# Patient Record
Sex: MALE | Race: WHITE | NOT HISPANIC OR LATINO | Employment: OTHER | URBAN - NONMETROPOLITAN AREA
[De-identification: names, ages, dates, MRNs, and addresses within clinical notes are randomized per-mention and may not be internally consistent; named-entity substitution may affect disease eponyms.]

---

## 2019-06-04 ENCOUNTER — TRANSCRIBE ORDERS (OUTPATIENT)
Dept: ADMINISTRATIVE | Facility: HOSPITAL | Age: 39
End: 2019-06-04

## 2019-06-04 DIAGNOSIS — R42 DIZZINESS: Primary | ICD-10-CM

## 2019-06-20 ENCOUNTER — HOSPITAL ENCOUNTER (OUTPATIENT)
Dept: CARDIOLOGY | Facility: HOSPITAL | Age: 39
Discharge: HOME | End: 2019-06-20
Admitting: NURSE PRACTITIONER

## 2019-06-20 DIAGNOSIS — R42 DIZZINESS: ICD-10-CM

## 2019-06-20 PROCEDURE — 93660 TILT TABLE EVALUATION: CPT | Performed by: INTERNAL MEDICINE

## 2019-06-20 PROCEDURE — 93660 TILT TABLE EVALUATION: CPT

## 2019-06-20 RX ORDER — NITROGLYCERIN 0.4 MG/1
0.4 TABLET SUBLINGUAL ONCE
Status: COMPLETED | OUTPATIENT
Start: 2019-06-20 | End: 2019-06-20

## 2019-06-20 RX ADMIN — NITROGLYCERIN 0.4 MG: 0.4 TABLET SUBLINGUAL at 13:49

## 2019-06-21 LAB — BH CV TILT AGENT USED: NORMAL

## 2020-07-16 ENCOUNTER — OFFICE VISIT (OUTPATIENT)
Dept: INTERNAL MEDICINE | Facility: CLINIC | Age: 40
End: 2020-07-16

## 2020-07-16 ENCOUNTER — RESULTS ENCOUNTER (OUTPATIENT)
Dept: INTERNAL MEDICINE | Facility: CLINIC | Age: 40
End: 2020-07-16

## 2020-07-16 VITALS
TEMPERATURE: 97.6 F | BODY MASS INDEX: 34.38 KG/M2 | DIASTOLIC BLOOD PRESSURE: 82 MMHG | HEART RATE: 84 BPM | SYSTOLIC BLOOD PRESSURE: 122 MMHG | OXYGEN SATURATION: 96 % | WEIGHT: 245.6 LBS | HEIGHT: 71 IN

## 2020-07-16 DIAGNOSIS — M25.561 CHRONIC PAIN OF BOTH KNEES: ICD-10-CM

## 2020-07-16 DIAGNOSIS — F31.81 BIPOLAR 2 DISORDER (HCC): ICD-10-CM

## 2020-07-16 DIAGNOSIS — Z76.89 ENCOUNTER TO ESTABLISH CARE: ICD-10-CM

## 2020-07-16 DIAGNOSIS — F41.9 ANXIETY: ICD-10-CM

## 2020-07-16 DIAGNOSIS — M25.562 CHRONIC PAIN OF BOTH KNEES: ICD-10-CM

## 2020-07-16 DIAGNOSIS — Z76.89 ENCOUNTER TO ESTABLISH CARE: Primary | ICD-10-CM

## 2020-07-16 DIAGNOSIS — Z11.59 NEED FOR HEPATITIS C SCREENING TEST: ICD-10-CM

## 2020-07-16 DIAGNOSIS — G89.29 CHRONIC PAIN OF BOTH KNEES: ICD-10-CM

## 2020-07-16 PROCEDURE — 99203 OFFICE O/P NEW LOW 30 MIN: CPT | Performed by: NURSE PRACTITIONER

## 2020-07-16 RX ORDER — ARIPIPRAZOLE 10 MG/1
TABLET ORAL
COMMUNITY
Start: 2020-05-18 | End: 2021-02-09

## 2020-07-16 RX ORDER — LAMOTRIGINE 100 MG/1
TABLET ORAL
COMMUNITY
Start: 2020-05-18 | End: 2021-02-09

## 2020-07-16 RX ORDER — MELOXICAM 7.5 MG/1
7.5 TABLET ORAL DAILY
Qty: 30 TABLET | Refills: 5 | Status: SHIPPED | OUTPATIENT
Start: 2020-07-16 | End: 2021-02-09

## 2020-07-16 RX ORDER — TRAZODONE HYDROCHLORIDE 100 MG/1
TABLET ORAL
COMMUNITY
Start: 2020-05-18 | End: 2021-02-09

## 2020-07-16 RX ORDER — BUSPIRONE HYDROCHLORIDE 30 MG/1
TABLET ORAL
COMMUNITY
Start: 2020-05-18 | End: 2021-02-09
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
## 2020-07-21 ENCOUNTER — RESULTS ENCOUNTER (OUTPATIENT)
Dept: INTERNAL MEDICINE | Facility: CLINIC | Age: 40
End: 2020-07-21

## 2020-07-21 DIAGNOSIS — M25.561 CHRONIC PAIN OF BOTH KNEES: ICD-10-CM

## 2020-07-21 DIAGNOSIS — Z76.89 ENCOUNTER TO ESTABLISH CARE: ICD-10-CM

## 2020-07-21 DIAGNOSIS — Z11.59 NEED FOR HEPATITIS C SCREENING TEST: ICD-10-CM

## 2020-07-21 DIAGNOSIS — M25.562 CHRONIC PAIN OF BOTH KNEES: ICD-10-CM

## 2020-07-21 DIAGNOSIS — G89.29 CHRONIC PAIN OF BOTH KNEES: ICD-10-CM

## 2020-11-10 ENCOUNTER — OFFICE VISIT (OUTPATIENT)
Dept: INTERNAL MEDICINE | Facility: CLINIC | Age: 40
End: 2020-11-10

## 2020-11-10 VITALS — HEIGHT: 71 IN | WEIGHT: 220 LBS | BODY MASS INDEX: 30.8 KG/M2

## 2020-11-10 DIAGNOSIS — J30.9 ALLERGIC RHINITIS, UNSPECIFIED SEASONALITY, UNSPECIFIED TRIGGER: Primary | ICD-10-CM

## 2020-11-10 DIAGNOSIS — R05.3 PERSISTENT DRY COUGH: ICD-10-CM

## 2020-11-10 DIAGNOSIS — J06.9 ACUTE UPPER RESPIRATORY INFECTION, UNSPECIFIED: ICD-10-CM

## 2020-11-10 PROCEDURE — 99442 PR PHYS/QHP TELEPHONE EVALUATION 11-20 MIN: CPT | Performed by: NURSE PRACTITIONER

## 2020-11-10 RX ORDER — CETIRIZINE HYDROCHLORIDE 10 MG/1
10 CAPSULE, LIQUID FILLED ORAL DAILY
Qty: 30 CAPSULE | Refills: 2 | Status: SHIPPED | OUTPATIENT
Start: 2020-11-10 | End: 2021-02-09

## 2020-11-10 RX ORDER — METHYLPREDNISOLONE 4 MG/1
TABLET ORAL
Qty: 1 TABLET | Refills: 0 | Status: SHIPPED | OUTPATIENT
Start: 2020-11-10 | End: 2021-02-09
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
## 2021-02-09 ENCOUNTER — OFFICE VISIT (OUTPATIENT)
Dept: INTERNAL MEDICINE | Facility: CLINIC | Age: 41
End: 2021-02-09

## 2021-02-09 ENCOUNTER — HOSPITAL ENCOUNTER (OUTPATIENT)
Dept: GENERAL RADIOLOGY | Facility: HOSPITAL | Age: 41
Discharge: HOME | End: 2021-02-09
Admitting: NURSE PRACTITIONER

## 2021-02-09 VITALS
SYSTOLIC BLOOD PRESSURE: 134 MMHG | DIASTOLIC BLOOD PRESSURE: 82 MMHG | HEART RATE: 72 BPM | WEIGHT: 249 LBS | HEIGHT: 71 IN | TEMPERATURE: 97.3 F | OXYGEN SATURATION: 99 % | BODY MASS INDEX: 34.86 KG/M2

## 2021-02-09 DIAGNOSIS — G89.29 CHRONIC PAIN OF BOTH KNEES: ICD-10-CM

## 2021-02-09 DIAGNOSIS — F41.9 ANXIETY: ICD-10-CM

## 2021-02-09 DIAGNOSIS — M25.561 CHRONIC PAIN OF BOTH KNEES: ICD-10-CM

## 2021-02-09 DIAGNOSIS — M25.562 CHRONIC PAIN OF BOTH KNEES: ICD-10-CM

## 2021-02-09 DIAGNOSIS — M54.50 ACUTE MIDLINE LOW BACK PAIN WITHOUT SCIATICA: Primary | ICD-10-CM

## 2021-02-09 DIAGNOSIS — M54.50 ACUTE MIDLINE LOW BACK PAIN WITHOUT SCIATICA: ICD-10-CM

## 2021-02-09 DIAGNOSIS — F31.81 BIPOLAR 2 DISORDER (HCC): ICD-10-CM

## 2021-02-09 PROBLEM — F31.9: Status: ACTIVE | Noted: 2021-02-09

## 2021-02-09 PROBLEM — I78.1 NON-NEOPLASTIC NEVUS: Status: ACTIVE | Noted: 2021-02-09

## 2021-02-09 PROBLEM — R05.3 CHRONIC COUGH: Status: ACTIVE | Noted: 2021-02-09

## 2021-02-09 PROCEDURE — 72100 X-RAY EXAM L-S SPINE 2/3 VWS: CPT

## 2021-02-09 PROCEDURE — 99213 OFFICE O/P EST LOW 20 MIN: CPT | Performed by: NURSE PRACTITIONER

## 2021-02-09 PROCEDURE — 73560 X-RAY EXAM OF KNEE 1 OR 2: CPT

## 2021-02-09 NOTE — PROGRESS NOTES
"Subjective   Gilberto Morrissey is a 40 y.o. male.   Chief Complaint   Patient presents with   • Follow-up     3 Month follow up   • Back Pain     felt like a reinjury left lumbar spine x 2 weeks       Gilberto presents today for follow-up on bipolar type II, anxiety, and ADHD.  He was supposed to follow-up about 3 months ago but patient just able to make the appointment.  He states that he is in with a therapist currently and is scheduled to see the psychiatrist for medication management at the end of this month.  He states he is not currently on any of his medications reporting that they stopped working about a year ago and is not continued on them.  He reports that he has \"intrusive thoughts\".  He states there are times where he thinks of hurting himself but that he does not act on them but they are just \"fleeting thoughts\".  He feels he is okay to wait until seeing his psychiatric provider at the end of this month to restart any medication.    He also complains of ongoing chronic knee pain bilaterally.  An x-ray was placed in the system for order at the last visit but this was never completed.  He describes the discomfort to his knees being \"like they are not there but they hurt\".  He compares it to a phantom pain from when he had teeth pulled in the past.  He was given a prescription for meloxicam at the previous visit which he states he took for a short time and then stopped taking.  He did not feel it was helping him but also states he did not take it for very long due to forgetfulness.    He also complains today of lower back pain.  He states in the past he was helping a woman carry something out of her car and when he bent over he \"threw his back out\".  He states this happened again recently within the last week but he was not lifting or moving at the time.  He has not been taking anything for symptoms.  He states the pain is slightly worsened when he twists side to side.       The following portions of the " patient's history were reviewed and updated as appropriate: allergies, current medications, past family history, past medical history, past social history, past surgical history and problem list.    Review of Systems   Constitutional: Negative for activity change, appetite change, fatigue, fever, unexpected weight gain and unexpected weight loss.   HENT: Negative for swollen glands, trouble swallowing and voice change.    Eyes: Negative for blurred vision and visual disturbance.   Respiratory: Negative for cough and shortness of breath.    Cardiovascular: Negative for chest pain, palpitations and leg swelling.   Gastrointestinal: Negative for abdominal pain, constipation, diarrhea, nausea, vomiting and indigestion.   Endocrine: Negative for cold intolerance, heat intolerance, polydipsia and polyphagia.   Genitourinary: Negative for dysuria and frequency.   Musculoskeletal: Positive for back pain. Negative for arthralgias, joint swelling and neck pain.        Bilateral knee pain   Skin: Negative for color change, rash and skin lesions.   Neurological: Negative for dizziness, weakness, headache, memory problem and confusion.   Hematological: Does not bruise/bleed easily.   Psychiatric/Behavioral: Negative for agitation, hallucinations and suicidal ideas. The patient is nervous/anxious.        Objective   Past Medical History:   Diagnosis Date   • ADHD (attention deficit hyperactivity disorder)    • Anxiety    • Bipolar 2 disorder (CMS/HCC)    • Depression       Past Surgical History:   Procedure Laterality Date   • DENTAL PROCEDURE  2011    extraction      No current outpatient medications on file.     Vitals:    02/09/21 1512   BP: 134/82   Pulse: 72   Temp: 97.3 °F (36.3 °C)   SpO2: 99%         02/09/21  1512   Weight: 113 kg (249 lb)     Patient's Body mass index is 34.73 kg/m². BMI is above normal parameters. Recommendations include: nutrition counseling.      Physical Exam  HENT:      Right Ear: Tympanic membrane  and external ear normal.      Left Ear: Tympanic membrane and external ear normal.      Nose: Nose normal.      Mouth/Throat:      Mouth: Mucous membranes are moist. No oral lesions.      Pharynx: Oropharynx is clear.   Eyes:      Conjunctiva/sclera: Conjunctivae normal.      Pupils: Pupils are equal, round, and reactive to light.   Neck:      Musculoskeletal: Normal range of motion.      Thyroid: No thyromegaly.   Cardiovascular:      Rate and Rhythm: Normal rate and regular rhythm.      Pulses: Normal pulses.           Radial pulses are 2+ on the right side and 2+ on the left side.      Heart sounds: Normal heart sounds.   Pulmonary:      Effort: Pulmonary effort is normal.      Breath sounds: Normal breath sounds.   Musculoskeletal:        Arms:       Right lower leg: No edema.      Left lower leg: No edema.      Comments: Negative straight leg raises bilaterally   Lymphadenopathy:      Cervical: No cervical adenopathy.   Skin:     General: Skin is warm and dry.   Neurological:      Mental Status: He is alert and oriented to person, place, and time.      Gait: Gait normal.   Psychiatric:         Mood and Affect: Mood normal.         Speech: Speech normal.         Behavior: Behavior normal.         Thought Content: Thought content normal.               Assessment/Plan   Diagnoses and all orders for this visit:    1. Acute midline low back pain without sciatica (Primary)  -     XR Spine Lumbar 2 or 3 View; Future    2. Chronic pain of both knees    3. Bipolar 2 disorder (CMS/HCC)    4. Anxiety    He is going to get his yearly lab work done today while in the office.  He is not fasting today but would like to see these especially if prescribing medication such as anti-inflammatories.  I have advised him to go get the x-rays completed.  I have requested that he try using a heating pad to the lower back and ibuprofen as needed or he can start back on his meloxicam.  I have strongly encouraged him to keep his follow-up  appointments with a psychiatrist at the end of this month.  We did spend a lot of time discussing different medications that he is taken in the past and why he felt they want to work for him anymore.  I do feel like he would benefit more from having a psychiatric provider manage his mood medications.  He is agreeable to this and states that he feels okay to wait until the end of this month to restart any medication.

## 2021-02-10 LAB
ALBUMIN SERPL-MCNC: 4.8 G/DL (ref 4–5)
ALBUMIN/GLOB SERPL: 1.8 {RATIO} (ref 1.2–2.2)
ALP SERPL-CCNC: 105 IU/L (ref 39–117)
ALT SERPL-CCNC: 35 IU/L (ref 0–44)
APPEARANCE UR: CLEAR
AST SERPL-CCNC: 25 IU/L (ref 0–40)
BACTERIA #/AREA URNS HPF: NORMAL /[HPF]
BASOPHILS # BLD AUTO: 0.1 X10E3/UL (ref 0–0.2)
BASOPHILS NFR BLD AUTO: 1 %
BILIRUB SERPL-MCNC: 0.3 MG/DL (ref 0–1.2)
BILIRUB UR QL STRIP: NEGATIVE
BUN SERPL-MCNC: 13 MG/DL (ref 6–20)
BUN/CREAT SERPL: 11 (ref 9–20)
CALCIUM SERPL-MCNC: 9.7 MG/DL (ref 8.7–10.2)
CHLORIDE SERPL-SCNC: 104 MMOL/L (ref 96–106)
CHOLEST SERPL-MCNC: 171 MG/DL (ref 100–199)
CO2 SERPL-SCNC: 24 MMOL/L (ref 20–29)
COLOR UR: YELLOW
CREAT SERPL-MCNC: 1.17 MG/DL (ref 0.76–1.27)
EOSINOPHIL # BLD AUTO: 0.1 X10E3/UL (ref 0–0.4)
EOSINOPHIL NFR BLD AUTO: 1 %
EPI CELLS #/AREA URNS HPF: NORMAL /HPF (ref 0–10)
ERYTHROCYTE [DISTWIDTH] IN BLOOD BY AUTOMATED COUNT: 13.6 % (ref 11.6–15.4)
ERYTHROCYTE [SEDIMENTATION RATE] IN BLOOD BY WESTERGREN METHOD: 11 MM/HR (ref 0–15)
GLOBULIN SER CALC-MCNC: 2.6 G/DL (ref 1.5–4.5)
GLUCOSE SERPL-MCNC: 93 MG/DL (ref 65–99)
GLUCOSE UR QL: NEGATIVE
HCT VFR BLD AUTO: 44.8 % (ref 37.5–51)
HCV AB S/CO SERPL IA: <0.1 S/CO RATIO (ref 0–0.9)
HDLC SERPL-MCNC: 25 MG/DL
HGB BLD-MCNC: 15.8 G/DL (ref 13–17.7)
HGB UR QL STRIP: NEGATIVE
IMM GRANULOCYTES # BLD AUTO: 0 X10E3/UL (ref 0–0.1)
IMM GRANULOCYTES NFR BLD AUTO: 0 %
KETONES UR QL STRIP: NEGATIVE
LDLC SERPL CALC-MCNC: 72 MG/DL (ref 0–99)
LEUKOCYTE ESTERASE UR QL STRIP: NEGATIVE
LYMPHOCYTES # BLD AUTO: 1.9 X10E3/UL (ref 0.7–3.1)
LYMPHOCYTES NFR BLD AUTO: 25 %
MCH RBC QN AUTO: 28.6 PG (ref 26.6–33)
MCHC RBC AUTO-ENTMCNC: 35.3 G/DL (ref 31.5–35.7)
MCV RBC AUTO: 81 FL (ref 79–97)
MICRO URNS: NORMAL
MICRO URNS: NORMAL
MONOCYTES # BLD AUTO: 0.7 X10E3/UL (ref 0.1–0.9)
MONOCYTES NFR BLD AUTO: 9 %
MUCOUS THREADS URNS QL MICRO: PRESENT
NEUTROPHILS # BLD AUTO: 5 X10E3/UL (ref 1.4–7)
NEUTROPHILS NFR BLD AUTO: 64 %
NITRITE UR QL STRIP: NEGATIVE
PH UR STRIP: 7.5 [PH] (ref 5–7.5)
PLATELET # BLD AUTO: 297 X10E3/UL (ref 150–450)
POTASSIUM SERPL-SCNC: 4.5 MMOL/L (ref 3.5–5.2)
PROT SERPL-MCNC: 7.4 G/DL (ref 6–8.5)
PROT UR QL STRIP: NEGATIVE
RBC # BLD AUTO: 5.53 X10E6/UL (ref 4.14–5.8)
RBC #/AREA URNS HPF: NORMAL /HPF (ref 0–2)
SODIUM SERPL-SCNC: 142 MMOL/L (ref 134–144)
SP GR UR: 1.02 (ref 1–1.03)
TRIGL SERPL-MCNC: 467 MG/DL (ref 0–149)
TSH SERPL DL<=0.005 MIU/L-ACNC: 1.88 UIU/ML (ref 0.45–4.5)
URATE SERPL-MCNC: 7.8 MG/DL (ref 3.8–8.4)
UROBILINOGEN UR STRIP-MCNC: 0.2 MG/DL (ref 0.2–1)
VLDLC SERPL CALC-MCNC: 74 MG/DL (ref 5–40)
WBC # BLD AUTO: 7.8 X10E3/UL (ref 3.4–10.8)
WBC #/AREA URNS HPF: NORMAL /HPF (ref 0–5)

## 2021-02-10 NOTE — PROGRESS NOTES
Joint space narrowing to the left knee.  Normal imaging of the right knee.  We can refer to ortho is he is interested.

## 2021-02-10 NOTE — PROGRESS NOTES
Degenerative changes such as narrowing disc spaces and spurring.  At this time I would suggest physical therapy and Meloxicam which he already has.

## 2021-02-24 DIAGNOSIS — M54.50 ACUTE MIDLINE LOW BACK PAIN WITHOUT SCIATICA: Primary | ICD-10-CM

## 2021-02-24 DIAGNOSIS — M25.562 CHRONIC PAIN OF BOTH KNEES: ICD-10-CM

## 2021-02-24 DIAGNOSIS — M25.561 CHRONIC PAIN OF BOTH KNEES: ICD-10-CM

## 2021-02-24 DIAGNOSIS — G89.29 CHRONIC PAIN OF BOTH KNEES: ICD-10-CM

## 2021-02-28 ENCOUNTER — PATIENT MESSAGE (OUTPATIENT)
Dept: INTERNAL MEDICINE | Facility: CLINIC | Age: 41
End: 2021-02-28

## 2021-03-01 DIAGNOSIS — F41.9 ANXIETY: ICD-10-CM

## 2021-03-01 DIAGNOSIS — F31.9 MILD BIPOLAR DISORDER (HCC): Primary | ICD-10-CM

## 2021-03-01 NOTE — TELEPHONE ENCOUNTER
I have placed the order for this.  I am under if he should call or if they will call him.  Please let him know what he needs to do.

## 2022-03-25 ENCOUNTER — OFFICE VISIT (OUTPATIENT)
Dept: INTERNAL MEDICINE | Facility: CLINIC | Age: 42
End: 2022-03-25

## 2022-03-25 VITALS
HEIGHT: 69 IN | WEIGHT: 233.6 LBS | BODY MASS INDEX: 34.6 KG/M2 | TEMPERATURE: 97.1 F | OXYGEN SATURATION: 99 % | HEART RATE: 107 BPM | SYSTOLIC BLOOD PRESSURE: 142 MMHG | DIASTOLIC BLOOD PRESSURE: 90 MMHG

## 2022-03-25 DIAGNOSIS — F52.32 DELAYED EJACULATION: ICD-10-CM

## 2022-03-25 DIAGNOSIS — G89.29 CHRONIC PAIN OF LEFT KNEE: Primary | ICD-10-CM

## 2022-03-25 DIAGNOSIS — M25.562 CHRONIC PAIN OF LEFT KNEE: Primary | ICD-10-CM

## 2022-03-25 PROCEDURE — 99213 OFFICE O/P EST LOW 20 MIN: CPT | Performed by: NURSE PRACTITIONER

## 2022-03-25 RX ORDER — DEXTROAMPHETAMINE SACCHARATE, AMPHETAMINE ASPARTATE, DEXTROAMPHETAMINE SULFATE AND AMPHETAMINE SULFATE 2.5; 2.5; 2.5; 2.5 MG/1; MG/1; MG/1; MG/1
10 TABLET ORAL DAILY
COMMUNITY

## 2022-03-25 RX ORDER — LAMOTRIGINE 100 MG/1
200 TABLET ORAL DAILY
COMMUNITY
End: 2022-10-04 | Stop reason: SDUPTHER

## 2022-03-25 RX ORDER — DEXTROAMPHETAMINE SACCHARATE, AMPHETAMINE ASPARTATE MONOHYDRATE, DEXTROAMPHETAMINE SULFATE AND AMPHETAMINE SULFATE 2.5; 2.5; 2.5; 2.5 MG/1; MG/1; MG/1; MG/1
20 CAPSULE, EXTENDED RELEASE ORAL EVERY MORNING
COMMUNITY
End: 2022-10-04 | Stop reason: SDUPTHER

## 2022-03-25 NOTE — PROGRESS NOTES
"Smiley Morrissey is a 41 y.o. male.   Chief Complaint   Patient presents with   • Knee Pain     L knee issues, was here 1 yr ago w/treatment   • Erectile Dysfunction     Consistently has had this issue and did not know it was an issue until he was told it was       Kilo presents today for complaints of left knee pain.  He was previously evaluated for this about 1 year ago.  He started physical therapy on his back and did not see improvement so stopped going, but did not return to have completed on his knee.  She complains of constant discomfort but denies pain.  He states he either feels \"all of my weight on it and other times like it isn't there at all\".  He has been using a cane to walk when needed.  He states when he sits for a long time and then stands it can be numb.      He also would like to discuss problems with sexual performance.  He states it \"takes way too long\" to achieve orgasm.  He reports it has been this way since [facility].  No problems getting or keeping an erection.  He states it take a lot of work to achieve and erection.  He states alone he can achieve climax in 5 -10 minutes, but could take up to 45 minutes.  With a partner it can take 8 minutes at minimum but again can last up to 45 minutes but will usually have to stop and finish himself.  He states sometimes he gives up.  He states he did have depression as a teenager but was not on antidepressants at that time.          The following portions of the patient's history were reviewed and updated as appropriate: allergies, current medications, past family history, past medical history, past social history, past surgical history and problem list.    Review of Systems   Constitutional: Negative for activity change, appetite change, fatigue, fever, unexpected weight gain and unexpected weight loss.   HENT: Negative for swollen glands, trouble swallowing and voice change.    Eyes: Negative for blurred vision and visual " disturbance.   Respiratory: Negative for cough and shortness of breath.    Cardiovascular: Negative for chest pain, palpitations and leg swelling.   Gastrointestinal: Negative for abdominal pain, constipation, diarrhea, nausea, vomiting and indigestion.   Endocrine: Negative for cold intolerance, heat intolerance, polydipsia and polyphagia.   Genitourinary: Negative for dysuria and frequency.        Delayed ejaculation    Musculoskeletal: Negative for arthralgias, back pain, joint swelling and neck pain.        Left knee pain   Skin: Negative for color change, rash and skin lesions.   Neurological: Negative for dizziness, weakness, headache, memory problem and confusion.   Hematological: Does not bruise/bleed easily.   Psychiatric/Behavioral: Negative for agitation, hallucinations and suicidal ideas. The patient is not nervous/anxious.        Objective   Past Medical History:   Diagnosis Date   • ADHD (attention deficit hyperactivity disorder)    • Anxiety    • Bipolar 2 disorder (HCC)    • Depression       Past Surgical History:   Procedure Laterality Date   • DENTAL PROCEDURE  2011    extraction        Current Outpatient Medications:   •  amphetamine-dextroamphetamine (ADDERALL) 10 MG tablet, Take 10 mg by mouth Daily., Disp: , Rfl:   •  amphetamine-dextroamphetamine XR (ADDERALL XR) 10 MG 24 hr capsule, Take 20 mg by mouth Every Morning, Disp: , Rfl:   •  lamoTRIgine (LaMICtal) 100 MG tablet, Take 200 mg by mouth Daily., Disp: , Rfl:       Vitals:    03/25/22 1127   BP: 142/90   Pulse: 107   Temp: 97.1 °F (36.2 °C)   SpO2: 99%         03/25/22  1127   Weight: 106 kg (233 lb 9.6 oz)       Body mass index is 34.5 kg/m².    Physical Exam  Constitutional:       General: He is not in acute distress.     Appearance: He is well-developed.   HENT:      Head: Normocephalic.      Right Ear: External ear normal.      Left Ear: External ear normal.      Mouth/Throat:      Mouth: No oral lesions.   Cardiovascular:      Rate  and Rhythm: Normal rate and regular rhythm.      Heart sounds: Normal heart sounds.   Pulmonary:      Effort: Pulmonary effort is normal.      Breath sounds: Normal breath sounds.   Musculoskeletal:      Comments: Walking around room without difficulty; no limitations on range of motion to left knee   Skin:     General: Skin is warm and dry.   Neurological:      Mental Status: He is alert and oriented to person, place, and time.      Gait: Gait normal.   Psychiatric:         Mood and Affect: Mood normal.         Speech: Speech normal.         Behavior: Behavior normal.         Thought Content: Thought content normal.               Assessment/Plan   Diagnoses and all orders for this visit:    1. Chronic pain of left knee (Primary)  -     Ambulatory Referral to Orthopedic Surgery    2. Delayed ejaculation      Gilberto is not interested in repeating physical therapy at this time.  He did not see improvement in back pain with this.  Will refer to Ortho for chronic left knee pain. Imaging completed in 2021.    In regards to his delayed ejaculation, I feel there is likely a psychological component to this and possibly some medication interference.  Advised he discuss this with his psych provider and therapist.  Will check testosterone levels as well.  Consider referral to urology if appropriate.   Overdue for annual physical exam.  Labs placed and instructed patient to return for physical and fasting labs.

## 2022-04-06 ENCOUNTER — TELEPHONE (OUTPATIENT)
Dept: INTERNAL MEDICINE | Facility: CLINIC | Age: 42
End: 2022-04-06

## 2022-04-06 NOTE — TELEPHONE ENCOUNTER
----- Message from PAO Bermeo sent at 4/6/2022 12:18 PM CDT -----  Overall labs ok except for elevated triglycerides and low HDL.  Needs to work on diet improvements.  Avoid added sugar and processed foods.  Increase sources of health fat from fish, olive oil, avocados, nuts, seeds, etc.

## 2022-04-19 ENCOUNTER — OFFICE VISIT (OUTPATIENT)
Dept: INTERNAL MEDICINE | Facility: CLINIC | Age: 42
End: 2022-04-19

## 2022-04-19 VITALS
OXYGEN SATURATION: 98 % | HEIGHT: 69 IN | DIASTOLIC BLOOD PRESSURE: 84 MMHG | WEIGHT: 241 LBS | HEART RATE: 103 BPM | SYSTOLIC BLOOD PRESSURE: 128 MMHG | BODY MASS INDEX: 35.7 KG/M2 | TEMPERATURE: 97.1 F

## 2022-04-19 DIAGNOSIS — E78.1 HYPERTRIGLYCERIDEMIA: ICD-10-CM

## 2022-04-19 DIAGNOSIS — Z72.0 TOBACCO ABUSE: ICD-10-CM

## 2022-04-19 DIAGNOSIS — E66.01 CLASS 2 SEVERE OBESITY DUE TO EXCESS CALORIES WITH SERIOUS COMORBIDITY AND BODY MASS INDEX (BMI) OF 35.0 TO 35.9 IN ADULT: ICD-10-CM

## 2022-04-19 DIAGNOSIS — Z00.00 WELLNESS EXAMINATION: Primary | ICD-10-CM

## 2022-04-19 PROCEDURE — 99395 PREV VISIT EST AGE 18-39: CPT | Performed by: NURSE PRACTITIONER

## 2022-04-19 NOTE — PROGRESS NOTES
"Chief Complaint  Annual Exam    Subjective          Gilberto Morrissey presents to [facility]  Patient is here today for a wellness exam and to discuss yearly labs.       Objective   Vital Signs:   /84 (BP Location: Left arm, Patient Position: Sitting, Cuff Size: Adult)   Pulse 103   Temp 97.1 °F (36.2 °C)   Ht 175.3 cm (69\")   Wt 109 kg (241 lb)   SpO2 98%   BMI 35.59 kg/m²     BMI is above normal parameters. Recommendations: educational material discussed/shared in after visit summary, exercise counseling/recommendations and nutrition counseling/recommendations       Physical Exam  Vitals and nursing note reviewed.   Constitutional:       Appearance: Normal appearance. He is well-developed. He is obese.   HENT:      Head: Normocephalic and atraumatic.      Right Ear: Tympanic membrane, ear canal and external ear normal.      Left Ear: Tympanic membrane, ear canal and external ear normal.      Nose: Nose normal. No septal deviation, nasal tenderness or congestion.      Mouth/Throat:      Lips: Pink. No lesions.      Mouth: Mucous membranes are moist. No oral lesions.      Dentition: Normal dentition.      Pharynx: Oropharynx is clear. No pharyngeal swelling, oropharyngeal exudate or posterior oropharyngeal erythema.   Eyes:      General: Lids are normal. Vision grossly intact. No scleral icterus.        Right eye: No discharge.         Left eye: No discharge.      Extraocular Movements: Extraocular movements intact.      Conjunctiva/sclera: Conjunctivae normal.      Right eye: Right conjunctiva is not injected.      Left eye: Left conjunctiva is not injected.      Pupils: Pupils are equal, round, and reactive to light.   Neck:      Thyroid: No thyroid mass.      Trachea: Trachea normal.   Cardiovascular:      Rate and Rhythm: Normal rate and regular rhythm.      Heart sounds: Normal heart sounds. No murmur heard.    No gallop.   Pulmonary:      Effort: Pulmonary effort is " normal.      Breath sounds: Normal breath sounds and air entry. No wheezing, rhonchi or rales.   Musculoskeletal:         General: No tenderness or deformity. Normal range of motion.      Cervical back: Full passive range of motion without pain, normal range of motion and neck supple.      Thoracic back: Normal.      Right lower leg: No edema.      Left lower leg: No edema.   Skin:     General: Skin is warm and dry.      Coloration: Skin is not jaundiced.      Findings: No rash.   Neurological:      Mental Status: He is alert and oriented to person, place, and time.      Cranial Nerves: Cranial nerves are intact.      Sensory: Sensation is intact.      Motor: Motor function is intact.      Coordination: Coordination is intact.      Gait: Gait is intact.      Deep Tendon Reflexes: Reflexes are normal and symmetric.   Psychiatric:         Mood and Affect: Mood and affect normal.         Behavior: Behavior normal.         Judgment: Judgment normal.        Result Review :   The following data was reviewed by: PAO Watson on 04/19/2022:  Common labs    Common Labsle 4/4/22 4/4/22 4/4/22 4/4/22    0901 0901 0901 0901   Glucose 100 (A)      BUN 13      Creatinine 1.00      Sodium 140      Potassium 4.3      Chloride 100      Calcium 9.9      Total Protein 7.4      Albumin 5.0      Total Bilirubin 0.4      Alkaline Phosphatase 115      AST (SGOT) 26      ALT (SGPT) 35      WBC  8.3     Hemoglobin  15.7     Hematocrit  46.2     Platelets  291     Total Cholesterol   162    Triglycerides   303 (A)    HDL Cholesterol   28 (A)    LDL Cholesterol    84    Uric Acid    6.6   (A) Abnormal value       Comments are available for some flowsheets but are not being displayed.                    Assessment and Plan    Diagnoses and all orders for this visit:    1. Wellness examination (Primary)    2. Hypertriglyceridemia    3. Class 2 severe obesity due to excess calories with serious comorbidity and body mass index (BMI)  "of 35.0 to 35.9 in adult (HCC)    4. Tobacco abuse    Wellness visit completed today.   Discussed labs. Trigs elevated. Discussed diet changes. He states that he is in poverty and cannot afford healthy food. He wishes to start medication to help control this since insurance will pay for his medication. Patient does admit to smoking but states \"it's not very much.\" encouraged to quit smoking at this time.   Plan:  1. Start lipitor  2. Recheck in6 months.       Follow Up   Return in about 6 months (around 10/19/2022) for Recheck.  Patient was given instructions and counseling regarding his condition or for health maintenance advice. Please see specific information pulled into the AVS if appropriate.       "

## 2022-04-25 DIAGNOSIS — E78.1 HYPERTRIGLYCERIDEMIA: Primary | ICD-10-CM

## 2022-04-25 RX ORDER — ATORVASTATIN CALCIUM 10 MG/1
10 TABLET, FILM COATED ORAL DAILY
Qty: 30 TABLET | Refills: 5 | Status: SHIPPED | OUTPATIENT
Start: 2022-04-25 | End: 2023-01-05

## 2022-08-19 ENCOUNTER — HOSPITAL ENCOUNTER (OUTPATIENT)
Dept: GENERAL RADIOLOGY | Facility: HOSPITAL | Age: 42
Discharge: HOME | End: 2022-08-19

## 2022-08-19 ENCOUNTER — OFFICE VISIT (OUTPATIENT)
Dept: INTERNAL MEDICINE | Facility: CLINIC | Age: 42
End: 2022-08-19

## 2022-08-19 VITALS
SYSTOLIC BLOOD PRESSURE: 160 MMHG | BODY MASS INDEX: 34.36 KG/M2 | HEIGHT: 69 IN | DIASTOLIC BLOOD PRESSURE: 100 MMHG | OXYGEN SATURATION: 96 % | WEIGHT: 232 LBS | TEMPERATURE: 96.9 F | HEART RATE: 108 BPM

## 2022-08-19 DIAGNOSIS — M54.50 CHRONIC LEFT-SIDED LOW BACK PAIN WITHOUT SCIATICA: Primary | ICD-10-CM

## 2022-08-19 DIAGNOSIS — M54.50 CHRONIC LEFT-SIDED LOW BACK PAIN WITHOUT SCIATICA: ICD-10-CM

## 2022-08-19 DIAGNOSIS — G89.29 CHRONIC LEFT-SIDED LOW BACK PAIN WITHOUT SCIATICA: ICD-10-CM

## 2022-08-19 DIAGNOSIS — I10 PRIMARY HYPERTENSION: ICD-10-CM

## 2022-08-19 DIAGNOSIS — G89.29 CHRONIC LEFT-SIDED LOW BACK PAIN WITHOUT SCIATICA: Primary | ICD-10-CM

## 2022-08-19 PROCEDURE — 96372 THER/PROPH/DIAG INJ SC/IM: CPT

## 2022-08-19 PROCEDURE — 99213 OFFICE O/P EST LOW 20 MIN: CPT

## 2022-08-19 PROCEDURE — 72100 X-RAY EXAM L-S SPINE 2/3 VWS: CPT

## 2022-08-19 RX ORDER — LISINOPRIL 10 MG/1
10 TABLET ORAL DAILY
Qty: 30 TABLET | Refills: 0 | Status: SHIPPED | OUTPATIENT
Start: 2022-08-19 | End: 2022-09-12

## 2022-08-19 RX ORDER — KETOROLAC TROMETHAMINE 30 MG/ML
30 INJECTION, SOLUTION INTRAMUSCULAR; INTRAVENOUS ONCE
Status: COMPLETED | OUTPATIENT
Start: 2022-08-19 | End: 2022-08-19

## 2022-08-19 RX ORDER — METHYLPREDNISOLONE ACETATE 80 MG/ML
40 INJECTION, SUSPENSION INTRA-ARTICULAR; INTRALESIONAL; INTRAMUSCULAR; SOFT TISSUE ONCE
Status: COMPLETED | OUTPATIENT
Start: 2022-08-19 | End: 2022-08-19

## 2022-08-19 RX ADMIN — KETOROLAC TROMETHAMINE 30 MG: 30 INJECTION, SOLUTION INTRAMUSCULAR; INTRAVENOUS at 15:39

## 2022-08-19 RX ADMIN — METHYLPREDNISOLONE ACETATE 40 MG: 80 INJECTION, SUSPENSION INTRA-ARTICULAR; INTRALESIONAL; INTRAMUSCULAR; SOFT TISSUE at 15:40

## 2022-08-19 NOTE — PROGRESS NOTES
Subjective   Gilberto Morrissey is a 41 y.o. male.   Chief Complaint   Patient presents with   • Back Pain     Had injury in 2017, leaned over to grab a box of tiles, has mostly been fine but does have back pain sometimes, has tried PT       History of Present Illness   Kilo is here today for complaints of continued lower back pain.  He reports that the pain quality and location has not changed.  Reports that it is in the lower left side of his back.  Position changes are painful including going from sitting to standing position, getting out of bed is the most painful for him.  He denies any pain that radiates down the left or right leg, no numbness or tingling in either extremity, denies any loss of bowel or bladder function.  He states that his left leg does give out on him a lot however this is related to his and left knee issue which also needs to be seen about.  He reports that he is doing fine about 80% of the time however when his back pain kicks in usually last for about a week and he is tired of having to go to [facility] and different doctors to get a steroid shot and muscle lectures because all it does is give him momentary relief.  He states that it is affecting the quality of his life and the ability to take care of his children.  He states he is currently working on getting disability.  He reports that his blood pressures have been running higher here lately, was 132/85 the other day, a couple days before that he was running 146/88, another reading read 150/89.  He admits to sometimes feeling some chest pain, and reports that he gets short of breath with minimal exertion.  He reports he is also put on some weight.  States that it is difficult to maintain a healthy diet and to be physically active.    The following portions of the patient's history were reviewed and updated as appropriate: allergies, current medications, past family history, past medical history, past social history, past surgical  "history and problem list.    Review of Systems    Objective   Past Medical History:   Diagnosis Date   • ADHD (attention deficit hyperactivity disorder)    • Anxiety    • Bipolar 2 disorder (HCC)    • Depression       Past Surgical History:   Procedure Laterality Date   • DENTAL PROCEDURE  2011    extraction        Current Outpatient Medications:   •  amphetamine-dextroamphetamine (ADDERALL) 10 MG tablet, Take 10 mg by mouth Daily., Disp: , Rfl:   •  amphetamine-dextroamphetamine XR (ADDERALL XR) 10 MG 24 hr capsule, Take 20 mg by mouth Every Morning , Disp: , Rfl:   •  atorvastatin (Lipitor) 10 MG tablet, Take 1 tablet by mouth Daily., Disp: 30 tablet, Rfl: 5  •  lamoTRIgine (LaMICtal) 100 MG tablet, Take 200 mg by mouth Daily., Disp: , Rfl:   •  lisinopril (PRINIVIL,ZESTRIL) 10 MG tablet, Take 1 tablet by mouth Daily., Disp: 30 tablet, Rfl: 0      /100   Pulse 108   Temp 96.9 °F (36.1 °C)   Ht 175.3 cm (69\")   Wt 105 kg (232 lb)   SpO2 96%   BMI 34.26 kg/m²      Body mass index is 34.26 kg/m².  Class 2 Severe Obesity (BMI >=35 and <=39.9). Obesity-related health conditions include the following: hypertension and dyslipidemias. Obesity is worsening. BMI is is above average; BMI management plan is completed. We discussed portion control and increasing exercise.       Physical Exam  Vitals and nursing note reviewed.   Constitutional:       General: He is not in acute distress.     Appearance: Normal appearance. He is obese. He is not ill-appearing, toxic-appearing or diaphoretic.   HENT:      Head: Normocephalic and atraumatic.   Eyes:      Pupils: Pupils are equal, round, and reactive to light.   Cardiovascular:      Rate and Rhythm: Normal rate and regular rhythm.      Pulses: Normal pulses.      Heart sounds: Normal heart sounds.   Pulmonary:      Effort: Pulmonary effort is normal.      Breath sounds: Normal breath sounds.   Abdominal:      General: Bowel sounds are normal.   Musculoskeletal:         " General: Tenderness (lower left back) present. Normal range of motion.      Cervical back: Normal range of motion and neck supple.   Skin:     General: Skin is warm and dry.      Capillary Refill: Capillary refill takes less than 2 seconds.   Neurological:      General: No focal deficit present.      Mental Status: He is alert and oriented to person, place, and time. Mental status is at baseline.   Psychiatric:         Mood and Affect: Mood normal.         Behavior: Behavior normal.         Thought Content: Thought content normal.         Judgment: Judgment normal.               Assessment & Plan   Diagnoses and all orders for this visit:    1. Chronic left-sided low back pain without sciatica (Primary)  -     XR Spine Lumbar 2 or 3 View; Future  -     Ambulatory Referral to Pain Management    2. Primary hypertension  -     lisinopril (PRINIVIL,ZESTRIL) 10 MG tablet; Take 1 tablet by mouth Daily.  Dispense: 30 tablet; Refill: 0               Plan of care reviewed with Kilo  X-ray lumbar spine performed in February 2021 notes degenerative changes of the visualized spine.  This included endplate spurring.  He was then referred to physical therapy.  This apparently was not effective at all.  I did inform him that he had referral to Dr. Moser for the left knee.  I advised that since its been over a year since last x-ray, we will repeat this, we will go ahead and refer him to pain management.  We will treat with steroid injection and Toradol injection today.  Advise no ibuprofen later today.  After that may continue to use ibuprofen/Tylenol as needed for pain.  Advised that increasing activity and healthy diet can help with hypertension, especially watching sodium intake, no more than 2 g daily.  We will follow-up with him in 4 weeks to reassess hypertension given that we are starting him on lisinopril today.

## 2022-08-21 ENCOUNTER — HOSPITAL ENCOUNTER (EMERGENCY)
Facility: HOSPITAL | Age: 42
Discharge: HOME | End: 2022-08-21
Attending: EMERGENCY MEDICINE | Admitting: EMERGENCY MEDICINE

## 2022-08-21 ENCOUNTER — NURSE TRIAGE (OUTPATIENT)
Dept: CALL CENTER | Facility: HOSPITAL | Age: 42
End: 2022-08-21

## 2022-08-21 VITALS
OXYGEN SATURATION: 97 % | DIASTOLIC BLOOD PRESSURE: 100 MMHG | HEART RATE: 98 BPM | RESPIRATION RATE: 18 BRPM | HEIGHT: 70 IN | SYSTOLIC BLOOD PRESSURE: 148 MMHG | BODY MASS INDEX: 34.36 KG/M2 | WEIGHT: 240 LBS | TEMPERATURE: 99.7 F

## 2022-08-21 DIAGNOSIS — M54.50 ACUTE MIDLINE LOW BACK PAIN WITHOUT SCIATICA: Primary | ICD-10-CM

## 2022-08-21 PROCEDURE — 25010000002 METHYLPREDNISOLONE PER 125 MG: Performed by: EMERGENCY MEDICINE

## 2022-08-21 PROCEDURE — 96372 THER/PROPH/DIAG INJ SC/IM: CPT

## 2022-08-21 PROCEDURE — 99282 EMERGENCY DEPT VISIT SF MDM: CPT

## 2022-08-21 RX ORDER — PREDNISONE 20 MG/1
20 TABLET ORAL 2 TIMES DAILY
Qty: 14 TABLET | Refills: 0 | Status: SHIPPED | OUTPATIENT
Start: 2022-08-21 | End: 2022-09-06

## 2022-08-21 RX ORDER — INDOMETHACIN 50 MG/1
50 CAPSULE ORAL
Qty: 30 CAPSULE | Refills: 0 | Status: SHIPPED | OUTPATIENT
Start: 2022-08-21 | End: 2022-09-06

## 2022-08-21 RX ORDER — METHYLPREDNISOLONE SODIUM SUCCINATE 125 MG/2ML
80 INJECTION, POWDER, LYOPHILIZED, FOR SOLUTION INTRAMUSCULAR; INTRAVENOUS ONCE
Status: COMPLETED | OUTPATIENT
Start: 2022-08-21 | End: 2022-08-21

## 2022-08-21 RX ADMIN — METHYLPREDNISOLONE SODIUM SUCCINATE 80 MG: 125 INJECTION, POWDER, FOR SOLUTION INTRAMUSCULAR; INTRAVENOUS at 19:43

## 2022-08-21 NOTE — TELEPHONE ENCOUNTER
He has chronic pain in back, has referral for pain mgmt, just had xray done last week, no results yet,  this pain is unbearable, he states and is getting worse each day,  told him only  Other thing to do is ER again, if cannot stand the pain, and follow up tomorrow with PCP on findings on xrays done Friday.   Reason for Disposition  • [1] SEVERE back pain (e.g., excruciating, unable to do any normal activities) AND [2] not improved 2 hours after pain medicine    Additional Information  • Negative: Passed out (i.e., lost consciousness, collapsed and was not responding)  • Negative: Shock suspected (e.g., cold/pale/clammy skin, too weak to stand, low BP, rapid pulse)  • Negative: Sounds like a life-threatening emergency to the triager  • Negative: Major injury to the back (e.g., MVA, fall > 10 feet or 3 meters, penetrating injury, etc.)  • Negative: Followed a tailbone injury  • Negative: [1] Pain in the upper back over the ribs (rib cage) AND [2] radiates (travels, goes) into chest  • Negative: [1] Pain in the upper back over the ribs (rib cage) AND [2] worsened by coughing (or clearly increases with breathing)  • Negative: Back pain during pregnancy  • Negative: Pain mainly in flank (i.e., in the side, over the lower ribs or just below the ribs)  • Negative: [1] SEVERE back pain (e.g., excruciating) AND [2] sudden onset AND [3] age > 60 years  • Negative: [1] Unable to urinate (or only a few drops) > 4 hours AND [2] bladder feels very full (e.g., palpable bladder or strong urge to urinate)  • Negative: [1] Loss of bladder or bowel control (urine or bowel incontinence; wetting self, leaking stool) AND [2] new-onset  • Negative: Numbness in groin or rectal area (i.e., loss of sensation)  • Negative: [1] SEVERE abdominal pain AND [2] present > 1 hour  • Negative: [1] Abdominal pain AND [2] age > 60 years  • Negative: Weakness of a leg or foot (e.g., unable to bear weight, dragging foot)  • Negative: Unable to walk  •  "Negative: Patient sounds very sick or weak to the triager  • Negative: [1] Pain radiates into the thigh or further down the leg AND [2] both legs  • Negative: [1] Fever > 100.0 F (37.8 C) AND [2] flank pain (i.e., in side, below ribs and above hip)  • Negative: [1] Pain or burning with passing urine (urination) AND [2] flank pain (i.e., in side, below ribs and above hip)  • Negative: Numbness in a leg or foot (i.e., loss of sensation)  • Negative: [1] Numbness in an arm or hand (i.e., loss of sensation) AND [2] upper back pain  • Negative: High-risk adult (e.g., history of cancer, HIV, or IV drug use)  • Negative: Soft tissue infection (e.g., abscess, cellulitis) or other serious infection (e.g., bacteremia) in last 2 weeks  • Negative: [1] Fever AND [2] no symptoms of UTI  (Exception: has generalized muscle pains, not localized back pain)  • Negative: Rash in same area as pain (may be described as \"small blisters\")  • Negative: Blood in urine (red, pink, or tea-colored)  • Negative: [1] MODERATE back pain (e.g., interferes with normal activities) AND [2] present > 3 days  • Negative: [1] Pain radiates into the thigh or further down the leg AND [2] one leg  • Negative: [1] Age > 50 AND [2] no history of prior similar back pain  • Negative: Back pain present > 2 weeks    Answer Assessment - Initial Assessment Questions  1. ONSET: \"When did the pain begin?\"       Chronic but started hurting worse this past week  2. LOCATION: \"Where does it hurt?\" (upper, mid or lower back)      Lower back left side of spine  3. SEVERITY: \"How bad is the pain?\"  (e.g., Scale 1-10; mild, moderate, or severe)    - MILD (1-3): doesn't interfere with normal activities     - MODERATE (4-7): interferes with normal activities or awakens from sleep     - SEVERE (8-10): excruciating pain, unable to do any normal activities       Pain rated 7-8  4. PATTERN: \"Is the pain constant?\" (e.g., yes, no; constant, intermittent)       Constant   5. " "RADIATION: \"Does the pain shoot into your legs or elsewhere?\"      o radiation   6. CAUSE:  \"What do you think is causing the back pain?\"       Injury years ago 2017 have re injured it recently  7. BACK OVERUSE:  \"Any recent lifting of heavy objects, strenuous work or exercise?\"      no  8. MEDICATIONS: \"What have you taken so far for the pain?\" (e.g., nothing, acetaminophen, NSAIDS)       Nsaids, no pain meds  9. NEUROLOGIC SYMPTOMS: \"Do you have any weakness, numbness, or problems with bowel/bladder control?\"      no  10. OTHER SYMPTOMS: \"Do you have any other symptoms?\" (e.g., fever, abdominal pain, burning with urination, blood in urine)        Shooting pain in lower back  11. PREGNANCY: \"Is there any chance you are pregnant?\" (e.g., yes, no; LMP)        no    Protocols used: BACK PAIN-ADULT-AH      "

## 2022-08-22 NOTE — PROGRESS NOTES
X-ray of lumbar spine shows no changes from previous x-ray done in February 2021, notes chronic degenerative changes of the lumbar spine, no acute bony abnormality.  We will continue with referral to pain management as we discussed at our visit.

## 2022-08-22 NOTE — ED PROVIDER NOTES
Subjective   Patient presents with a complaint of low back pain.  He says his low back pain has been recurrent several times over the past 3 to 4 years.  He had an episode about a month ago.  It is normally treated with some steroid injections and then gradually gets better.  However he had another episode of flareup a few days ago.  This is the first time is recurred this soon after a previous episode about a month ago.  He saw his primary care doctor on Friday and had another x-rays shown.  These are plain x-rays and I did talk about spurring and loss of joint space according to the patient.  He has not had any further evaluation.  They did give him an injection of steroids and a nonsteroidal injection at that time which seemed to help a little bit but gave nothing to follow-up with and he does not seem to be getting better.  He has a referral in place to go to an orthopedic specialist for back evaluation and for pain management but has not followed up with either 1 of these referrals as yet.  He is here today because the pain does not seem to be getting better after the recent injections.  He says he has to make it to the local for a son who has to have an MRI.  He is worried about being able to do that as he needs.      History provided by:  Patient   used: No    Back Pain  Location:  Lumbar spine  Quality:  Aching  Radiates to:  Does not radiate  Pain severity:  Severe  Pain is:  Same all the time  Onset quality:  Gradual  Duration:  5 days  Timing:  Constant  Progression:  Unchanged  Chronicity:  Recurrent  Context: not emotional stress, not falling, not jumping from heights, not lifting heavy objects, not MCA, not MVA, not occupational injury, not pedestrian accident, not physical stress, not recent illness, not recent injury and not twisting    Relieved by:  Nothing  Worsened by:  Movement  Ineffective treatments:  Narcotics and NSAIDs  Associated symptoms: no abdominal pain, no bladder  incontinence, no bowel incontinence, no dysuria, no fever, no leg pain, no numbness, no pelvic pain, no perianal numbness and no weight loss    Risk factors: steroid use    Risk factors: no hx of cancer, no hx of osteoporosis, no lack of exercise, no menopause, not obese and no vascular disease        Review of Systems   Constitutional: Negative.  Negative for fever and weight loss.   HENT: Negative.    Respiratory: Negative.    Cardiovascular: Negative.    Gastrointestinal: Negative.  Negative for abdominal pain and bowel incontinence.   Genitourinary: Negative.  Negative for bladder incontinence, dysuria and pelvic pain.   Musculoskeletal: Positive for back pain.   Skin: Negative.    Neurological: Negative.  Negative for numbness.   Psychiatric/Behavioral: Negative.    All other systems reviewed and are negative.      Past Medical History:   Diagnosis Date   • ADHD (attention deficit hyperactivity disorder)    • Anxiety    • Bipolar 2 disorder (HCC)    • Depression        No Known Allergies    Past Surgical History:   Procedure Laterality Date   • DENTAL PROCEDURE  2011    extraction       Family History   Problem Relation Age of Onset   • Depression Brother    • Cancer Maternal Aunt    • Cancer Maternal Grandmother    • Dementia Maternal Grandmother    • Cancer Maternal Grandfather    • Cancer Paternal Grandmother        Social History     Socioeconomic History   • Marital status:    Tobacco Use   • Smoking status: Current Some Day Smoker     Packs/day: 1.00     Years: 0.00     Pack years: 0.00     Types: Cigars   • Smokeless tobacco: Never Used   • Tobacco comment: 1 pack per year   Substance and Sexual Activity   • Alcohol use: Yes     Comment: ocassional   • Drug use: Never   • Sexual activity: Yes     Partners: Female     Birth control/protection: Condom       Prior to Admission medications    Medication Sig Start Date End Date Taking? Authorizing Provider   amphetamine-dextroamphetamine (ADDERALL) 10  MG tablet Take 10 mg by mouth Daily.    Bill Quiñones MD   amphetamine-dextroamphetamine XR (ADDERALL XR) 10 MG 24 hr capsule Take 20 mg by mouth Every Morning     Bill Quiñones MD   atorvastatin (Lipitor) 10 MG tablet Take 1 tablet by mouth Daily. 4/25/22   Nieves Campbell APRN   indomethacin (INDOCIN) 50 MG capsule Take 1 capsule by mouth 3 (Three) Times a Day With Meals. 8/21/22   Sean Oneil Jr., MD   lamoTRIgine (LaMICtal) 100 MG tablet Take 200 mg by mouth Daily.    Bill Quiñones MD   lisinopril (PRINIVIL,ZESTRIL) 10 MG tablet Take 1 tablet by mouth Daily. 8/19/22   Lissa Hollis APRN   predniSONE (DELTASONE) 20 MG tablet Take 1 tablet by mouth 2 (Two) Times a Day. 8/21/22   Sean Oneil Jr., MD       Medications   methylPREDNISolone sodium succinate (SOLU-Medrol) injection 80 mg (has no administration in time range)       Vitals:    08/21/22 1905   BP: 148/100   Pulse: 98   Resp: 18   Temp: 99.7 °F (37.6 °C)   SpO2: 97%         Objective   Physical Exam  Vitals and nursing note reviewed.   Constitutional:       Appearance: Normal appearance.   HENT:      Head: Normocephalic and atraumatic.   Musculoskeletal:         General: Tenderness present. Normal range of motion.      Cervical back: Normal range of motion and neck supple.      Comments: Patient is mildly tender to palpation along the spine just a little left of center.  There is no obvious deformities noted.  Straight leg reflexes are negative.  Deep tendon relaxes are 2+ and equal.   Skin:     General: Skin is warm and dry.   Neurological:      General: No focal deficit present.      Mental Status: He is alert and oriented to person, place, and time.   Psychiatric:         Mood and Affect: Mood normal.         Behavior: Behavior normal.         Procedures         Lab Results (last 24 hours)     ** No results found for the last 24 hours. **          No orders to display       ED Course  ED Course as of  08/21/22 1941   Sun Aug 21, 2022   1940 I told the patient that it sounds like he does need further evaluation by back specialist and possibly an MRI.  We cannot do those in the emergency room as a routine basis.  I can get a CT done for him today but I am not sure is the best test because an MRI would be better.  I do think he needs a few days of continued steroids and anti-inflammatory to get this better.  He says pain medicine does not really work for him and he would have muscle relaxers.  We will try the medications and see if we get him some relief.  I did agree with a referral for a back specialist.  I am not sure I understand the rationale for referral to pain management in a patient who is never had a complete evaluation of his back because she had.  At any rate he is discharged in stable condition with treatment plan to follow. [TR]      ED Course User Index  [TR] Sean Oneil Jr., MD          MDM  Number of Diagnoses or Management Options  Acute midline low back pain without sciatica: new and does not require workup  Risk of Complications, Morbidity, and/or Mortality  Presenting problems: moderate  Diagnostic procedures: low  Management options: moderate    Patient Progress  Patient progress: stable      Final diagnoses:   Acute midline low back pain without sciatica          Sean Oneil Jr., MD  08/21/22 1941

## 2022-08-29 DIAGNOSIS — M54.41 CHRONIC RIGHT-SIDED LOW BACK PAIN WITH RIGHT-SIDED SCIATICA: Primary | ICD-10-CM

## 2022-08-29 DIAGNOSIS — G89.29 CHRONIC RIGHT-SIDED LOW BACK PAIN WITH RIGHT-SIDED SCIATICA: Primary | ICD-10-CM

## 2022-09-02 ENCOUNTER — HOSPITAL ENCOUNTER (OUTPATIENT)
Dept: MRI IMAGING | Facility: HOSPITAL | Age: 42
Discharge: HOME | End: 2022-09-02

## 2022-09-02 DIAGNOSIS — M54.41 CHRONIC RIGHT-SIDED LOW BACK PAIN WITH RIGHT-SIDED SCIATICA: ICD-10-CM

## 2022-09-02 DIAGNOSIS — G89.29 CHRONIC RIGHT-SIDED LOW BACK PAIN WITH RIGHT-SIDED SCIATICA: ICD-10-CM

## 2022-09-02 PROCEDURE — 72148 MRI LUMBAR SPINE W/O DYE: CPT

## 2022-09-06 ENCOUNTER — OFFICE VISIT (OUTPATIENT)
Dept: INTERNAL MEDICINE | Facility: CLINIC | Age: 42
End: 2022-09-06

## 2022-09-06 VITALS
DIASTOLIC BLOOD PRESSURE: 88 MMHG | WEIGHT: 233 LBS | BODY MASS INDEX: 33.36 KG/M2 | OXYGEN SATURATION: 98 % | TEMPERATURE: 97.1 F | HEART RATE: 112 BPM | SYSTOLIC BLOOD PRESSURE: 118 MMHG | HEIGHT: 70 IN

## 2022-09-06 DIAGNOSIS — I10 PRIMARY HYPERTENSION: ICD-10-CM

## 2022-09-06 DIAGNOSIS — M54.41 CHRONIC RIGHT-SIDED LOW BACK PAIN WITH RIGHT-SIDED SCIATICA: ICD-10-CM

## 2022-09-06 DIAGNOSIS — M47.816 LUMBAR FACET ARTHROPATHY: Primary | ICD-10-CM

## 2022-09-06 DIAGNOSIS — G89.29 CHRONIC RIGHT-SIDED LOW BACK PAIN WITH RIGHT-SIDED SCIATICA: ICD-10-CM

## 2022-09-06 PROCEDURE — 99213 OFFICE O/P EST LOW 20 MIN: CPT

## 2022-09-06 NOTE — PROGRESS NOTES
"Smiley Morrissey is a 41 y.o. male.   Chief Complaint   Patient presents with   • Back Pain     Discuss MRI results, pain has shifted to R hip, has improved quite a bit but still having constant pain, worse at night (thinks this may be r/t him sleeping on his side), discuss dizziness/light headedness (feels like head is not there no pain)       History of Present Illness   Kilo presents here today for complaints of back pain that now presents with numbness and tingling as well as follow-up on hypertension.  He reports that after ER visit back on August 21 the pain in his lower back stopped and he started having pain in the right hip.  Reports that he experienced it more on the lateral posterior aspect of the right hip had anterior and posterior thigh muscle cramping and tightness and also developed anterior medial numbness and tingling in the right lower leg.  Reports that he also had numbness along the medial aspect of right foot to the large toe, states that the \"big toe was stuck in the air for 2 days \"and all this is now better except for he still experiencing some pain in the right hip.  He denies any new activities or injuries that may have caused any injury to his right hip.  States that he was prescribed a steroid pack and some indomethacin in the ER, has finished a steroid pack but has continued to take the indomethacin and is wondering if he should stop it.  He admits that he typically does not take this with food and has noted some diarrhea the last several days.  He denies any episodes where his right leg gave out, states then that he does not think he would notice if this happened because of how he has had to deal with constant pain in his left knee since the age of 15 years old.  Completed MRI of lumbar spine on September 2.  States that he was unable to make it to his pain management appointment because something came up and he did not get to bed until 5:00 in the morning.  Is unsure " about having another appointment made.  Did try physical therapy in the past and this was not beneficial to him.  States that he has been doing some reading and he is considering doing jorge chi and yoga again to help with stretching and strengthening.  He denies any fevers, night sweats.  When asked about family history of inflammatory arthritis he states he is unsure but he is sure somebody has something.    He was seen in office on August 19, had a blood pressure of 148/100, was started on lisinopril 10 mg daily.  He has not been checking blood pressures at home does have a blood pressure cuff.  Reports that he had 3 to 4 days of dizziness and yesterday was the worst.  Reports that he went grocery shopping which is not particularly strenuous for him and he got extremely dizzy and lightheaded states that he felt like his head was not there.  States that he laid down and this did not help and even after the dizziness resolved he felt zapped for the rest of the evening.  He states that looking back he thinks he should have checked his blood sugar as he often forgets to eat, typically will eat 2 meals a day but sometimes will go as late as 10:00 at night before eating.  He denies any feelings of clamminess or being diaphoretic.  He denies any headache, vision changes, palpitations, chest pain, or shortness of breath.  He did not assess blood pressure at this time.  Has been taking his lisinopril 10 mg around 10:00 in the mornings.  He believes that forgetting to eat is something that is related to his Adderall dose and will be talking to his psychiatrist about this.      The following portions of the patient's history were reviewed and updated as appropriate: allergies, current medications, past family history, past medical history, past social history, past surgical history and problem list.    Review of Systems    Objective   Past Medical History:   Diagnosis Date   • ADHD (attention deficit hyperactivity disorder)   "  • Anxiety    • Bipolar 2 disorder (HCC)    • Depression       Past Surgical History:   Procedure Laterality Date   • DENTAL PROCEDURE  2011    extraction        Current Outpatient Medications:   •  amphetamine-dextroamphetamine (ADDERALL) 10 MG tablet, Take 10 mg by mouth Daily., Disp: , Rfl:   •  amphetamine-dextroamphetamine XR (ADDERALL XR) 10 MG 24 hr capsule, Take 20 mg by mouth Every Morning , Disp: , Rfl:   •  atorvastatin (Lipitor) 10 MG tablet, Take 1 tablet by mouth Daily., Disp: 30 tablet, Rfl: 5  •  lamoTRIgine (LaMICtal) 100 MG tablet, Take 200 mg by mouth Daily., Disp: , Rfl:   •  lisinopril (PRINIVIL,ZESTRIL) 10 MG tablet, Take 1 tablet by mouth Daily., Disp: 30 tablet, Rfl: 0      /88   Pulse 112   Temp 97.1 °F (36.2 °C)   Ht 177.8 cm (70\")   Wt 106 kg (233 lb)   SpO2 98%   BMI 33.43 kg/m²      Body mass index is 33.43 kg/m².         Physical Exam  Vitals and nursing note reviewed.   Constitutional:       General: He is not in acute distress.     Appearance: Normal appearance. He is not ill-appearing, toxic-appearing or diaphoretic.   HENT:      Head: Normocephalic and atraumatic.   Eyes:      Extraocular Movements: Extraocular movements intact.      Conjunctiva/sclera: Conjunctivae normal.      Pupils: Pupils are equal, round, and reactive to light.   Cardiovascular:      Rate and Rhythm: Normal rate and regular rhythm.      Pulses: Normal pulses.      Heart sounds: Normal heart sounds.   Pulmonary:      Effort: Pulmonary effort is normal.      Breath sounds: Normal breath sounds.   Abdominal:      General: Bowel sounds are normal.      Palpations: Abdomen is soft.   Musculoskeletal:         General: Tenderness (right lower back, right hip, lateral) present. No signs of injury.      Cervical back: Normal range of motion and neck supple. Tenderness present.      Lumbar back: Positive right straight leg raise test. Negative left straight leg raise test.        Back:       Right lower " leg: No edema.      Left lower leg: No edema.   Skin:     General: Skin is warm and dry.      Capillary Refill: Capillary refill takes less than 2 seconds.   Neurological:      General: No focal deficit present.      Mental Status: He is alert and oriented to person, place, and time. Mental status is at baseline.      Motor: No weakness.   Psychiatric:         Mood and Affect: Mood normal.         Behavior: Behavior normal.         Thought Content: Thought content normal.         Judgment: Judgment normal.               Assessment & Plan   Diagnoses and all orders for this visit:    1. Lumbar facet arthropathy (Primary)  -     Ambulatory Referral to Neurosurgery    2. Chronic right-sided low back pain with right-sided sciatica  -     Ambulatory Referral to Neurosurgery    3. Primary hypertension  -     Basic metabolic panel               Plan of care and imaging results reviewed with Kilo  MRI Lumbar Spine Without Contrast (09/02/2022 14:09) will refer to neurosurgery for their input on further intervention for this, has been referred to pain management at last visit, and has also reportedly tried PT with no benefit before.  Did have a positive right straight leg test, did not note any overt weakness in his right lower extremity, was comparable to the left.  Advised that he could stop taking the indomethacin since the pain was better and this is known to have negative GI side effects.  Given the initiation of the lisinopril and him being on all these anti-inflammatories would like to check a BMP today.  I did ask him to please check blood pressures prior to taking the lisinopril and to hold if blood pressure is less than 110/75, I am concerned that he did have an hypotensive episode, advised that it could have also been hypoglycemic, his wife does have a glucose meter at home, advised checking blood pressure and blood sugar if this occurs again and to please reach out if it does, we will follow-up in 4 weeks to  reevaluate.

## 2022-09-07 LAB
BUN SERPL-MCNC: 12 MG/DL (ref 6–20)
BUN/CREAT SERPL: 11 (ref 9–20)
CALCIUM SERPL-MCNC: 9.7 MG/DL (ref 8.7–10.2)
CHLORIDE SERPL-SCNC: 102 MMOL/L (ref 96–106)
CO2 SERPL-SCNC: 25 MMOL/L (ref 20–29)
CREAT SERPL-MCNC: 1.13 MG/DL (ref 0.76–1.27)
EGFRCR-CYS SERPLBLD CKD-EPI 2021: 85 ML/MIN/1.73
GLUCOSE SERPL-MCNC: 96 MG/DL (ref 65–99)
POTASSIUM SERPL-SCNC: 4.7 MMOL/L (ref 3.5–5.2)
SODIUM SERPL-SCNC: 143 MMOL/L (ref 134–144)

## 2022-09-08 ENCOUNTER — TELEPHONE (OUTPATIENT)
Dept: INTERNAL MEDICINE | Facility: CLINIC | Age: 42
End: 2022-09-08

## 2022-09-08 NOTE — TELEPHONE ENCOUNTER
Caller: Gilberto Morrissey    Relationship to patient: Self    Best call back number: [phone]    Patient is needing:  Pt is asking for a covid test to be ordered - he tried to go to [facility] in [place] and they said that a prescription is required for medicaid patients. He is asking for it to be sent to [facility] in [place]. He doesn't have the gas to get to [place].       Symptoms:  cough with some wheezing  Chills  diarrhea       No known exposure

## 2022-09-11 DIAGNOSIS — I10 PRIMARY HYPERTENSION: ICD-10-CM

## 2022-09-12 RX ORDER — LISINOPRIL 10 MG/1
TABLET ORAL
Qty: 30 TABLET | Refills: 11 | Status: SHIPPED | OUTPATIENT
Start: 2022-09-12 | End: 2023-01-05

## 2022-10-04 ENCOUNTER — OFFICE VISIT (OUTPATIENT)
Dept: INTERNAL MEDICINE | Facility: CLINIC | Age: 42
End: 2022-10-04

## 2022-10-04 ENCOUNTER — OFFICE VISIT (OUTPATIENT)
Dept: NEUROSURGERY | Facility: CLINIC | Age: 42
End: 2022-10-04

## 2022-10-04 VITALS
DIASTOLIC BLOOD PRESSURE: 86 MMHG | SYSTOLIC BLOOD PRESSURE: 118 MMHG | TEMPERATURE: 97.1 F | HEIGHT: 70 IN | BODY MASS INDEX: 33.5 KG/M2 | HEART RATE: 116 BPM | WEIGHT: 234 LBS | OXYGEN SATURATION: 98 %

## 2022-10-04 VITALS — WEIGHT: 234 LBS | HEIGHT: 70 IN | BODY MASS INDEX: 33.5 KG/M2

## 2022-10-04 DIAGNOSIS — M54.41 CHRONIC LOW BACK PAIN WITH RIGHT-SIDED SCIATICA, UNSPECIFIED BACK PAIN LATERALITY: Primary | ICD-10-CM

## 2022-10-04 DIAGNOSIS — H61.23 BILATERAL IMPACTED CERUMEN: ICD-10-CM

## 2022-10-04 DIAGNOSIS — G89.29 CHRONIC LOW BACK PAIN WITH RIGHT-SIDED SCIATICA, UNSPECIFIED BACK PAIN LATERALITY: Primary | ICD-10-CM

## 2022-10-04 DIAGNOSIS — R42 DIZZINESS: Primary | ICD-10-CM

## 2022-10-04 DIAGNOSIS — G47.00 INSOMNIA, UNSPECIFIED TYPE: ICD-10-CM

## 2022-10-04 DIAGNOSIS — G47.20 SLEEP PATTERN DISTURBANCE: ICD-10-CM

## 2022-10-04 DIAGNOSIS — M51.26 LUMBAR DISC HERNIATION: ICD-10-CM

## 2022-10-04 DIAGNOSIS — Z72.0 CURRENT OCCASIONAL SMOKER: ICD-10-CM

## 2022-10-04 PROCEDURE — 99213 OFFICE O/P EST LOW 20 MIN: CPT

## 2022-10-04 PROCEDURE — 99213 OFFICE O/P EST LOW 20 MIN: CPT | Performed by: NURSE PRACTITIONER

## 2022-10-04 PROCEDURE — 69210 REMOVE IMPACTED EAR WAX UNI: CPT

## 2022-10-04 PROCEDURE — 93000 ELECTROCARDIOGRAM COMPLETE: CPT

## 2022-10-04 RX ORDER — LAMOTRIGINE 200 MG/1
TABLET ORAL
COMMUNITY
Start: 2022-09-27

## 2022-10-04 RX ORDER — DEXTROAMPHETAMINE SULFATE, DEXTROAMPHETAMINE SACCHARATE, AMPHETAMINE SULFATE AND AMPHETAMINE ASPARTATE 5; 5; 5; 5 MG/1; MG/1; MG/1; MG/1
CAPSULE, EXTENDED RELEASE ORAL
COMMUNITY
Start: 2022-09-30

## 2022-10-04 RX ORDER — OXCARBAZEPINE 300 MG/1
TABLET, FILM COATED ORAL
COMMUNITY
Start: 2022-09-27

## 2022-10-04 NOTE — PROGRESS NOTES
"    Chief complaint:   Chief Complaint   Patient presents with   • Back Pain     Pt is here for evaluation for his back pain. This has been going on for years. He usually gets steroid shot and it goes away. But not this time.        Subjective     HPI: This is a 41-year-old male gentleman who was referred to us by PAO Willett for back pain.  He is here to be evaluated today.  Patient says that the pain is been going on since 2017.  Pain in his back is constant.  The pain initially was dull but it has become sharp.  He says the pain is worse with \"existing\" and moving in certain positions.  Nothing in particular makes it better.  He said he did have an onset of right hip pain about a month and a half ago with numbness and tingling but this did improve.  He denies any bowel or bladder incontinence.  He did physical therapy in 2021 without any significant improvement.  He has not done any chiropractic care pain management injections.  He is right-hand dominant.  He currently is working as a self-employed [employer]  but states that he is trying to obtain disability.  He is .  He does smoke cigarettes and drinks alcohol occasionally.  Denies any illicit drug use.    Review of Systems   Constitutional: Positive for fatigue.   Respiratory: Positive for cough.    Gastrointestinal: Positive for diarrhea.   Musculoskeletal: Positive for back pain.   Neurological: Positive for dizziness, weakness and light-headedness.   Psychiatric/Behavioral: Positive for decreased concentration and sleep disturbance. The patient is nervous/anxious and is hyperactive.    All other systems reviewed and are negative.       Past Medical History:   Diagnosis Date   • ADHD (attention deficit hyperactivity disorder)    • Anxiety    • Bipolar 2 disorder (HCC)    • Claustrophobia    • Depression      Past Surgical History:   Procedure Laterality Date   • DENTAL PROCEDURE  2011    extraction     Family History   Problem " "Relation Age of Onset   • Depression Brother    • Cancer Maternal Aunt    • Cancer Maternal Grandmother    • Dementia Maternal Grandmother    • Cancer Maternal Grandfather    • Cancer Paternal Grandmother      Social History     Tobacco Use   • Smoking status: Current Some Day Smoker     Packs/day: 1.00     Years: 0.00     Pack years: 0.00     Types: Cigars   • Smokeless tobacco: Never Used   • Tobacco comment: 1 pack per year   Substance Use Topics   • Alcohol use: Yes     Comment: ocassional   • Drug use: Never     (Not in a hospital admission)    Allergies:  Patient has no known allergies.    Objective      Vital Signs  Ht 177.8 cm (70\")   Wt 106 kg (234 lb)   BMI 33.58 kg/m²     Physical Exam  Constitutional:       Appearance: Normal appearance. He is well-developed.   HENT:      Head: Normocephalic.   Eyes:      General: Lids are normal.      Extraocular Movements: EOM normal.      Conjunctiva/sclera: Conjunctivae normal.      Pupils: Pupils are equal, round, and reactive to light.   Cardiovascular:      Rate and Rhythm: Normal rate and regular rhythm.   Pulmonary:      Effort: Pulmonary effort is normal.      Breath sounds: Normal breath sounds.   Musculoskeletal:         General: Normal range of motion.      Cervical back: Normal range of motion.      Comments: Back pain   Skin:     General: Skin is warm.   Neurological:      Mental Status: He is alert and oriented to person, place, and time.      GCS: GCS eye subscore is 4. GCS verbal subscore is 5. GCS motor subscore is 6.      Cranial Nerves: No cranial nerve deficit.      Sensory: No sensory deficit.      Gait: Gait is intact.      Deep Tendon Reflexes: Strength normal and reflexes are normal and symmetric. Reflexes normal.   Psychiatric:         Speech: Speech normal.         Behavior: Behavior normal.         Thought Content: Thought content normal.         Neurologic Exam     Mental Status   Oriented to person, place, and time.   Attention: normal. " Concentration: normal.   Speech: speech is normal   Level of consciousness: alert  Normal comprehension.     Cranial Nerves     CN II   Visual fields full to confrontation.     CN III, IV, VI   Pupils are equal, round, and reactive to light.  Extraocular motions are normal.     CN V   Facial sensation intact.     CN VII   Facial expression full, symmetric.     CN VIII   CN VIII normal.     CN IX, X   CN IX normal.   CN X normal.     CN XI   CN XI normal.     CN XII   CN XII normal.     Motor Exam   Muscle bulk: normal    Strength   Strength 5/5 throughout.     Sensory Exam   Light touch normal.     Gait, Coordination, and Reflexes     Gait  Gait: normal    Reflexes   Reflexes 2+ except as noted.       Imaging review: MRI of the lumbar spine that was done on September 2, 2022 shows a disc protrusion behind the L4 vertebral body.  There is some right-sided foraminal narrowing at L4-5.  At L5-S1 there is bilateral foraminal narrowing with the left being worse than the right.  At L3-4 mild bilateral foraminal narrowing.  At L2-3 mild disc bulging off to the right.  No fracture visualized.  No cord signal change.    L4-5      L5-S1      Assessment/Plan: At this point I am not seeing anything from a surgical standpoint that I think would be of significant benefit in relieving the patient's overall back pain.  He says that he does have an appointment to see pain management.  I did encourage him to keep this appointment.  At this time we will follow-up with him on an as-needed basis.  He was told to call us if any further problems or concerns  Patient is a nonsmoker  The patient's Body mass index is 33.58 kg/m².. BMI is above normal parameters. Recommendations include: educational material and nutrition counseling    Diagnoses and all orders for this visit:    1. Chronic low back pain with right-sided sciatica, unspecified back pain laterality (Primary)    2. Lumbar disc herniation    3. Adult BMI 33.0-33.9 kg/sq m    4.  Current occasional smoker          I discussed the patients findings and my recommendations with patient    Dedrick Quiñones, APRN  10/04/22  11:57 CDT

## 2022-10-04 NOTE — PATIENT INSTRUCTIONS
"DASH Eating Plan  DASH stands for Dietary Approaches to Stop Hypertension. The DASH eating plan is a healthy eating plan that has been shown to:  Reduce high blood pressure (hypertension).  Reduce your risk for type 2 diabetes, heart disease, and stroke.  Help with weight loss.  What are tips for following this plan?  Reading food labels  Check food labels for the amount of salt (sodium) per serving. Choose foods with less than 5 percent of the Daily Value of sodium. Generally, foods with less than 300 milligrams (mg) of sodium per serving fit into this eating plan.  To find whole grains, look for the word \"whole\" as the first word in the ingredient list.  Shopping  Buy products labeled as \"low-sodium\" or \"no salt added.\"  Buy fresh foods. Avoid canned foods and pre-made or frozen meals.  Cooking  Avoid adding salt when cooking. Use salt-free seasonings or herbs instead of table salt or sea salt. Check with your health care provider or pharmacist before using salt substitutes.  Do not whitman foods. Cook foods using healthy methods such as baking, boiling, grilling, roasting, and broiling instead.  Cook with heart-healthy oils, such as olive, canola, avocado, soybean, or sunflower oil.  Meal planning    Eat a balanced diet that includes:  4 or more servings of fruits and 4 or more servings of vegetables each day. Try to fill one-half of your plate with fruits and vegetables.  6-8 servings of whole grains each day.  Less than 6 oz (170 g) of lean meat, poultry, or fish each day. A 3-oz (85-g) serving of meat is about the same size as a deck of cards. One egg equals 1 oz (28 g).  2-3 servings of low-fat dairy each day. One serving is 1 cup (237 mL).  1 serving of nuts, seeds, or beans 5 times each week.  2-3 servings of heart-healthy fats. Healthy fats called omega-3 fatty acids are found in foods such as walnuts, flaxseeds, fortified milks, and eggs. These fats are also found in cold-water fish, such as sardines, salmon, " and mackerel.  Limit how much you eat of:  Canned or prepackaged foods.  Food that is high in trans fat, such as some fried foods.  Food that is high in saturated fat, such as fatty meat.  Desserts and other sweets, sugary drinks, and other foods with added sugar.  Full-fat dairy products.  Do not salt foods before eating.  Do not eat more than 4 egg yolks a week.  Try to eat at least 2 vegetarian meals a week.  Eat more home-cooked food and less restaurant, buffet, and fast food.  Lifestyle  When eating at a restaurant, ask that your food be prepared with less salt or no salt, if possible.  If you drink alcohol:  Limit how much you use to:  0-1 drink a day for women who are not pregnant.  0-2 drinks a day for men.  Be aware of how much alcohol is in your drink. In the [place] one drink equals one 12 oz bottle of beer (355 mL), one 5 oz glass of wine (148 mL), or one 1½ oz glass of hard liquor (44 mL).  General information  Avoid eating more than 2,300 mg of salt a day. If you have hypertension, you may need to reduce your sodium intake to 1,500 mg a day.  Work with your health care provider to maintain a healthy body weight or to lose weight. Ask what an ideal weight is for you.  Get at least 30 minutes of exercise that causes your heart to beat faster (aerobic exercise) most days of the week. Activities may include walking, swimming, or biking.  Work with your health care provider or dietitian to adjust your eating plan to your individual calorie needs.  What foods should I eat?  Fruits  All fresh, dried, or frozen fruit. Canned fruit in natural juice (without added sugar).  Vegetables  Fresh or frozen vegetables (raw, steamed, roasted, or grilled). Low-sodium or reduced-sodium tomato and vegetable juice. Low-sodium or reduced-sodium tomato sauce and tomato paste. Low-sodium or reduced-sodium canned vegetables.  Grains  Whole-grain or whole-wheat bread. Whole-grain or whole-wheat pasta. Brown rice. Oatmeal. Quinoa.  Bulgur. Whole-grain and low-sodium cereals. Farrah bread. Low-fat, low-sodium crackers. Whole-wheat flour tortillas.  Meats and other proteins  Skinless chicken or turkey. Ground chicken or turkey. Pork with fat trimmed off. Fish and seafood. Egg whites. Dried beans, peas, or lentils. Unsalted nuts, nut butters, and seeds. Unsalted canned beans. Lean cuts of beef with fat trimmed off. Low-sodium, lean precooked or cured meat, such as sausages or meat loaves.  Dairy  Low-fat (1%) or fat-free (skim) milk. Reduced-fat, low-fat, or fat-free cheeses. Nonfat, low-sodium ricotta or cottage cheese. Low-fat or nonfat yogurt. Low-fat, low-sodium cheese.  Fats and oils  Soft margarine without trans fats. Vegetable oil. Reduced-fat, low-fat, or light mayonnaise and salad dressings (reduced-sodium). Canola, safflower, olive, avocado, soybean, and sunflower oils. Avocado.  Seasonings and condiments  Herbs. Spices. Seasoning mixes without salt.  Other foods  Unsalted popcorn and pretzels. Fat-free sweets.  The items listed above may not be a complete list of foods and beverages you can eat. Contact a dietitian for more information.  What foods should I avoid?  Fruits  Canned fruit in a light or heavy syrup. Fried fruit. Fruit in cream or butter sauce.  Vegetables  Creamed or fried vegetables. Vegetables in a cheese sauce. Regular canned vegetables (not low-sodium or reduced-sodium). Regular canned tomato sauce and paste (not low-sodium or reduced-sodium). Regular tomato and vegetable juice (not low-sodium or reduced-sodium). Pickles. Olives.  Grains  Baked goods made with fat, such as croissants, muffins, or some breads. Dry pasta or rice meal packs.  Meats and other proteins  Fatty cuts of meat. Ribs. Fried meat. Rivero. Bologna, salami, and other precooked or cured meats, such as sausages or meat loaves. Fat from the back of a pig (fatback). Bratwurst. Salted nuts and seeds. Canned beans with added salt. Canned or smoked fish.  Whole eggs or egg yolks. Chicken or turkey with skin.  Dairy  Whole or 2% milk, cream, and half-and-half. Whole or full-fat cream cheese. Whole-fat or sweetened yogurt. Full-fat cheese. Nondairy creamers. Whipped toppings. Processed cheese and cheese spreads.  Fats and oils  Butter. Stick margarine. Lard. Shortening. Ghee. Rivero fat. Tropical oils, such as coconut, palm kernel, or palm oil.  Seasonings and condiments  Onion salt, garlic salt, seasoned salt, table salt, and sea salt. Worcestershire sauce. Tartar sauce. Barbecue sauce. Teriyaki sauce. Soy sauce, including reduced-sodium. Steak sauce. Canned and packaged gravies. Fish sauce. Oyster sauce. Cocktail sauce. Store-bought horseradish. Ketchup. Mustard. Meat flavorings and tenderizers. Bouillon cubes. Hot sauces. Pre-made or packaged marinades. Pre-made or packaged taco seasonings. Relishes. Regular salad dressings.  Other foods  Salted popcorn and pretzels.  The items listed above may not be a complete list of foods and beverages you should avoid. Contact a dietitian for more information.  Where to find more information  National Heart, Lung, and Blood [place]: www.nhlbi.nih.gov  American Heart Association: www.heart.org  Academy of Nutrition and Dietetics: www.eatright.org  National Kidney Foundation: www.kidney.org  Summary  The DASH eating plan is a healthy eating plan that has been shown to reduce high blood pressure (hypertension). It may also reduce your risk for type 2 diabetes, heart disease, and stroke.  When on the DASH eating plan, aim to eat more fresh fruits and vegetables, whole grains, lean proteins, low-fat dairy, and heart-healthy fats.  With the DASH eating plan, you should limit salt (sodium) intake to 2,300 mg a day. If you have hypertension, you may need to reduce your sodium intake to 1,500 mg a day.  Work with your health care provider or dietitian to adjust your eating plan to your individual calorie needs.  This information is not  "intended to replace advice given to you by your health care provider. Make sure you discuss any questions you have with your health care provider.  Document Revised: 11/20/2020 Document Reviewed: 11/20/2020  Elsevier Patient Education © 2022 [employer]  BMI for Adults  What is BMI?  Body mass index (BMI) is a number that is calculated from a person's weight and height. BMI can help estimate how much of a person's weight is composed of fat. BMI does not measure body fat directly. Rather, it is an alternative to procedures that directly measure body fat, which can be difficult and expensive.  BMI can help identify people who may be at higher risk for certain medical problems.  What are BMI measurements used for?  BMI is used as a screening tool to identify possible weight problems. It helps determine whether a person is obese, overweight, a healthy weight, or underweight.  BMI is useful for:  Identifying a weight problem that may be related to a medical condition or may increase the risk for medical problems.  Promoting changes, such as changes in diet and exercise, to help reach a healthy weight. BMI screening can be repeated to see if these changes are working.  How is BMI calculated?  BMI involves measuring your weight in relation to your height. Both height and weight are measured, and the BMI is calculated from those numbers. This can be done either in English ([place] or metric measurements. Note that charts and online BMI calculators are available to help you find your BMI quickly and easily without having to do these calculations yourself.  To calculate your BMI in English ([place] measurements:    Measure your weight in pounds (lb).  Multiply the number of pounds by 703.  For example, for a person who weighs 180 lb, multiply that number by 703, which equals 126,540.  Measure your height in inches. Then multiply that number by itself to get a measurement called \"inches squared.\"  For example, for a person who " "is 70 inches tall, the \"inches squared\" measurement is 70 inches x 70 inches, which equals 4,900 inches squared.  Divide the total from step 2 (number of lb x 703) by the total from step 3 (inches squared): 126,540 ÷ 4,900 = 25.8. This is your BMI.  To calculate your BMI in metric measurements:  Measure your weight in kilograms (kg).  Measure your height in meters (m). Then multiply that number by itself to get a measurement called \"meters squared.\"  For example, for a person who is 1.75 m tall, the \"meters squared\" measurement is 1.75 m x 1.75 m, which is equal to 3.1 meters squared.  Divide the number of kilograms (your weight) by the meters squared number. In this example: 70 ÷ 3.1 = 22.6. This is your BMI.  What do the results mean?  BMI charts are used to identify whether you are underweight, normal weight, overweight, or obese. The following guidelines will be used:  Underweight: BMI less than 18.5.  Normal weight: BMI between 18.5 and 24.9.  Overweight: BMI between 25 and 29.9.  Obese: BMI of 30 or above.  Keep these notes in mind:  Weight includes both fat and muscle, so someone with a muscular build, such as an athlete, may have a BMI that is higher than 24.9. In cases like these, BMI is not an accurate measure of body fat.  To determine if excess body fat is the cause of a BMI of 25 or higher, further assessments may need to be done by a health care provider.  BMI is usually interpreted in the same way for men and women.  Where to find more information  For more information about BMI, including tools to quickly calculate your BMI, go to these websites:  Centers for Disease Control and Prevention: www.cdc.gov  American Heart Association: www.heart.org  National Heart, Lung, and Blood [place]: www.nhlbi.nih.gov  Summary  Body mass index (BMI) is a number that is calculated from a person's weight and height.  BMI may help estimate how much of a person's weight is composed of fat. BMI can help identify those " who may be at higher risk for certain medical problems.  BMI can be measured using English measurements or metric measurements.  BMI charts are used to identify whether you are underweight, normal weight, overweight, or obese.  This information is not intended to replace advice given to you by your health care provider. Make sure you discuss any questions you have with your health care provider.  Document Revised: 09/09/2020 Document Reviewed: 07/17/2020  [employer] Patient Education © 2022 [employer] Inc.  For more information:    Quit Now [place]  1-800-QUIT-NOW  https://[place].quitlogix.org/en-[place]/  Steps to Quit Smoking  Smoking tobacco can be harmful to your health and can affect almost every organ in your body. Smoking puts you, and those around you, at risk for developing many serious chronic diseases. Quitting smoking is difficult, but it is one of the best things that you can do for your health. It is never too late to quit.  What are the benefits of quitting smoking?  When you quit smoking, you lower your risk of developing serious diseases and conditions, such as:  Lung cancer or lung disease, such as COPD.  Heart disease.  Stroke.  Heart attack.  Infertility.  Osteoporosis and bone fractures.  Additionally, symptoms such as coughing, wheezing, and shortness of breath may get better when you quit. You may also find that you get sick less often because your body is stronger at fighting off colds and infections. If you are pregnant, quitting smoking can help to reduce your chances of having a baby of low birth weight.  How do I get ready to quit?  When you decide to quit smoking, create a plan to make sure that you are successful. Before you quit:  Pick a date to quit. Set a date within the next two weeks to give you time to prepare.  Write down the reasons why you are quitting. Keep this list in places where you will see it often, such as on your bathroom mirror or in your car or wallet.  Identify the  people, places, things, and activities that make you want to smoke (triggers) and avoid them. Make sure to take these actions:  Throw away all cigarettes at home, at work, and in your car.  Throw away smoking accessories, such as ashtrays and lighters.  Clean your car and make sure to empty the ashtray.  Clean your home, including curtains and carpets.  Tell your family, friends, and coworkers that you are quitting. Support from your loved ones can make quitting easier.  Talk with your health care provider about your options for quitting smoking.  Find out what treatment options are covered by your health insurance.  What strategies can I use to quit smoking?  Talk with your healthcare provider about different strategies to quit smoking. Some strategies include:  Quitting smoking altogether instead of gradually lessening how much you smoke over a period of time. Research shows that quitting “cold turkey” is more successful than gradually quitting.  Attending in-person counseling to help you build problem-solving skills. You are more likely to have success in quitting if you attend several counseling sessions. Even short sessions of 10 minutes can be effective.  Finding resources and support systems that can help you to quit smoking and remain smoke-free after you quit. These resources are most helpful when you use them often. They can include:  Online chats with a counselor.  Telephone quitlines.  Printed self-help materials.  Support groups or group counseling.  Text messaging programs.  Mobile phone applications.  Taking medicines to help you quit smoking. (If you are pregnant or breastfeeding, talk with your health care provider first.) Some medicines contain nicotine and some do not. Both types of medicines help with cravings, but the medicines that include nicotine help to relieve withdrawal symptoms. Your health care provider may recommend:  Nicotine patches, gum, or lozenges.  Nicotine inhalers or  sprays.  Non-nicotine medicine that is taken by mouth.  Talk with your health care provider about combining strategies, such as taking medicines while you are also receiving in-person counseling. Using these two strategies together makes you more likely to succeed in quitting than if you used either strategy on its own.  If you are pregnant or breastfeeding, talk with your health care provider about finding counseling or other support strategies to quit smoking. Do not take medicine to help you quit smoking unless told to do so by your health care provider.  What things can I do to make it easier to quit?  Quitting smoking might feel overwhelming at first, but there is a lot that you can do to make it easier. Take these important actions:  Reach out to your family and friends and ask that they support and encourage you during this time. Call telephone quitlines, reach out to support groups, or work with a counselor for support.  Ask people who smoke to avoid smoking around you.  Avoid places that trigger you to smoke, such as bars, parties, or smoke-break areas at work.  Spend time around people who do not smoke.  Lessen stress in your life, because stress can be a smoking trigger for some people. To lessen stress, try:  Exercising regularly.  Deep-breathing exercises.  Yoga.  Meditating.  Performing a body scan. This involves closing your eyes, scanning your body from head to toe, and noticing which parts of your body are particularly tense. Purposefully relax the muscles in those areas.  Download or purchase mobile phone or tablet apps (applications) that can help you stick to your quit plan by providing reminders, tips, and encouragement. There are many free apps, such as QuitGuide from the CDC (Centers for Disease Control and Prevention). You can find other support for quitting smoking (smoking cessation) through smokefree.gov and other websites.  How will I feel when I quit smoking?  Within the first 24 hours  of quitting smoking, you may start to feel some withdrawal symptoms. These symptoms are usually most noticeable 2-3 days after quitting, but they usually do not last beyond 2-3 weeks. Changes or symptoms that you might experience include:  Mood swings.  Restlessness, anxiety, or irritation.  Difficulty concentrating.  Dizziness.  Strong cravings for sugary foods in addition to nicotine.  Mild weight gain.  Constipation.  Nausea.  Coughing or a sore throat.  Changes in how your medicines work in your body.  A depressed mood.  Difficulty sleeping (insomnia).  After the first 2-3 weeks of quitting, you may start to notice more positive results, such as:  Improved sense of smell and taste.  Decreased coughing and sore throat.  Slower heart rate.  Lower blood pressure.  Clearer skin.  The ability to breathe more easily.  Fewer sick days.  Quitting smoking is very challenging for most people. Do not get discouraged if you are not successful the first time. Some people need to make many attempts to quit before they achieve long-term success. Do your best to stick to your quit plan, and talk with your health care provider if you have any questions or concerns.  This information is not intended to replace advice given to you by your health care provider. Make sure you discuss any questions you have with your health care provider.  Document Released: 12/12/2002 Document Revised: 08/15/2017 Document Reviewed: 05/03/2016  [employer] Patient Education © 2017 [employer]

## 2022-10-04 NOTE — PROGRESS NOTES
Subjective   Gilberto Morrissey is a 41 y.o. male.   Chief Complaint   Patient presents with   • Dizziness     4 wk recheck, states this is worse       Back Pain  This is a chronic problem. The current episode started more than 1 year ago. The problem occurs constantly. The problem has been gradually worsening since onset. The pain is present in the lumbar spine. The quality of the pain is described as aching. The pain radiates to the right thigh. The pain is at a severity of 5/10. The pain is the same all the time. Stiffness is present all day. Associated symptoms include pelvic pain. Pertinent negatives include no abdominal pain, bladder incontinence, bowel incontinence, chest pain, dysuria, fever, headaches, leg pain, numbness, paresis, paresthesias, perianal numbness, tingling, weakness or weight loss. Risk factors include lack of exercise, obesity, poor posture and sedentary lifestyle.      Kilo presents here today for 4-week follow-up on dizziness.  At his last visit he had mentioned that these episodes of dizziness had just started, after further discussing it appears that they started shortly after starting the lisinopril for hypertension.  He states that he has been checking his blood sugars and blood pressures after these episodes and they have been normal.  He describes the feeling of dizziness as feeling like his head is a balloon that deflates very quickly, states that the onset is fast and usually only last for few seconds, states that occasionally he will have an episode that lasts up to 10 minutes, reports that on the day this occurred he almost fell 4 times.  He does not recall this ever happening to him before, noted in 2019 that he had a tilt table performed for similar complaints, it was unrevealing.    The following portions of the patient's history were reviewed and updated as appropriate: allergies, current medications, past family history, past medical history, past social history, past  "surgical history and problem list.    Review of Systems   Constitutional: Negative for fever and unexpected weight loss.   Cardiovascular: Negative for chest pain.   Gastrointestinal: Negative for abdominal pain and bowel incontinence.   Genitourinary: Positive for pelvic pain. Negative for dysuria and urinary incontinence.   Musculoskeletal: Positive for back pain.   Neurological: Negative for tingling, weakness, numbness and paresthesias.       Objective   Past Medical History:   Diagnosis Date   • ADHD (attention deficit hyperactivity disorder)    • Anxiety    • Bipolar 2 disorder (HCC)    • Depression       Past Surgical History:   Procedure Laterality Date   • DENTAL PROCEDURE  2011    extraction        Current Outpatient Medications:   •  Adderall XR 20 MG 24 hr capsule, , Disp: , Rfl:   •  amphetamine-dextroamphetamine (ADDERALL) 10 MG tablet, Take 10 mg by mouth Daily., Disp: , Rfl:   •  atorvastatin (Lipitor) 10 MG tablet, Take 1 tablet by mouth Daily., Disp: 30 tablet, Rfl: 5  •  lamoTRIgine (LaMICtal) 200 MG tablet, TAKE 1.5 TABLET BY MOUTH ONCE A DAY, Disp: , Rfl:   •  lisinopril (PRINIVIL,ZESTRIL) 10 MG tablet, TAKE 1 TABLET BY MOUTH EVERY DAY, Disp: 30 tablet, Rfl: 11  •  OXcarbazepine (TRILEPTAL) 300 MG tablet, TAKE HALF A TABLET TWICE PER DAY FOR 1 WEEK. THEN TAKE 1 TABLET TWICE PER DAY THEREAFTER., Disp: , Rfl:       /86   Pulse 116   Temp 97.1 °F (36.2 °C) (Temporal)   Ht 177.8 cm (70\")   Wt 106 kg (234 lb)   SpO2 98%   BMI 33.58 kg/m²      Body mass index is 33.58 kg/m².         Physical Exam  Vitals and nursing note reviewed.   Constitutional:       General: He is not in acute distress.     Appearance: Normal appearance. He is obese. He is not ill-appearing, toxic-appearing or diaphoretic.   HENT:      Head: Normocephalic and atraumatic.      Right Ear: There is impacted cerumen.      Left Ear: There is impacted cerumen.   Eyes:      Pupils: Pupils are equal, round, and reactive to " light.   Cardiovascular:      Rate and Rhythm: Regular rhythm. Tachycardia present.      Pulses: Normal pulses.      Heart sounds: Normal heart sounds.   Pulmonary:      Effort: Pulmonary effort is normal.      Breath sounds: Normal breath sounds.   Abdominal:      General: Bowel sounds are normal.      Palpations: Abdomen is soft.   Musculoskeletal:         General: Normal range of motion.      Cervical back: Normal range of motion and neck supple.      Right lower leg: No edema.      Left lower leg: No edema.   Skin:     General: Skin is warm and dry.      Capillary Refill: Capillary refill takes less than 2 seconds.   Neurological:      General: No focal deficit present.      Mental Status: He is alert and oriented to person, place, and time. Mental status is at baseline.   Psychiatric:         Mood and Affect: Mood normal.         Behavior: Behavior normal.         Thought Content: Thought content normal.         Judgment: Judgment normal.               Ear Cerumen Removal    Date/Time: 10/4/2022 10:41 AM  Performed by: Lissa Hollis APRN  Authorized by: Lissa Hollis APRN     Anesthesia:  Local Anesthetic: none  Location details: left ear and right ear  Patient tolerance: patient tolerated the procedure well with no immediate complications  Procedure type: instrumentation, irrigation   Sedation:  Patient sedated: no        ECG 12 Lead    Date/Time: 10/4/2022 10:43 AM  Performed by: Lissa Hollis APRN  Authorized by: Lissa Hollis APRN   Comparison: not compared with previous ECG   Rhythm: sinus rhythm  Rate: normal  Conduction: conduction normal  ST Segments: ST segments normal  T Waves: T waves normal  Other: no other findings    Clinical impression: normal ECG  Comments: Note that leads were difficult to maintain on chest as he is very hairy, no clippers available to shave                 Assessment & Plan   Diagnoses and all orders for this visit:    1. Dizziness (Primary)    2.  Bilateral impacted cerumen    3. Insomnia, unspecified type  -     Ambulatory Referral to Sleep Medicine    4. Sleep pattern disturbance  -     Ambulatory Referral to Sleep Medicine    Other orders  -     Ear Cerumen Removal  -     ECG 12 Lead               Discussed plan of care with Kilo  After discussing when his symptoms of dizziness started we feel that it correlates with the initiation of the lisinopril 10 mg daily, I have seen great improvements in his blood pressures since starting this, is 118/86 here today, heart rate is elevated at 116, blood pressures and heart rates have been normal at home per his report.  We will trial decreasing the lisinopril to 5 mg a day for 7 days, he will message via [facility] with an update on his symptoms as well as blood pressures, may need discontinuation of this medication initiation of a new class.  EKG today.  May also consider ordering Holter monitor and stress test.  Given his history of having difficulty falling asleep and staying asleep for 10+ years and prior poor experience with sleep medicine he would like to try a new referral, this order has been placed.

## 2022-10-13 ENCOUNTER — OFFICE VISIT (OUTPATIENT)
Dept: NEUROLOGY | Facility: CLINIC | Age: 42
End: 2022-10-13

## 2022-10-13 VITALS
BODY MASS INDEX: 33.36 KG/M2 | RESPIRATION RATE: 18 BRPM | HEIGHT: 70 IN | WEIGHT: 233 LBS | DIASTOLIC BLOOD PRESSURE: 90 MMHG | SYSTOLIC BLOOD PRESSURE: 140 MMHG | HEART RATE: 110 BPM

## 2022-10-13 DIAGNOSIS — G47.9 SLEEP DISTURBANCE: Primary | ICD-10-CM

## 2022-10-13 PROCEDURE — 99203 OFFICE O/P NEW LOW 30 MIN: CPT | Performed by: PSYCHIATRY & NEUROLOGY

## 2022-10-13 NOTE — PATIENT INSTRUCTIONS
Patient to get with PCP about weight and diet control.  Patient continued driving and safety precautions as discussed

## 2022-10-13 NOTE — PROGRESS NOTES
HISTORY: Patient has had history of problems with sleep since age 13 or 14.  Patient has difficulty with excessive daytime fatigue.  Patient has awaken with dry mouth.  Patient has a rotating sleep schedule for uncertain reasons  Says it sometimes he will be sleeping at night then periods of time that he will be sleeping many mornings or afternoons.  He does not work.  He is disabled from mental disorders.  Patient has ADHD and takes Adderall extended release every morning at 10:00 and regular Adderall in the afternoon as prescribed by his psychiatrist .  Patient also takes multiple other medications for psychiatric problems including Lamictal/Trileptal.  Patient has history of bipolar disorder.  Patient has possible sleep paralysis/night terrors when younger but not recently.  Denies any cataplectic symptoms or other narcoleptic symptoms.  Patient has been told he moves/jerks during sleep and often has discomfort in legs with urge to move them.  He has been treated in the past for restless leg but not at this point patient has problems initiating sleep with frequent awakenings and pain that bothers him at night as well as generally restless sleep.  Patient says he wakes up occasionally screaming at night.  Patient had a seizure as an infant.  Patient says it sometimes takes 2 hours to fall asleep unless he pushes himself to exhaustion.  Patient does use caffeine.  He says sometimes caffeine and energy drinks make him sleepy.  No family history of sleep problems    EXAM: Patient blood pressure 140/90 left arm seated in same standing with pulse 110.  Neck circumference is 16.5 inches.  BMI is 33.43.  STOP-BANG is 4.  [place] Sleepiness Scale is 3.  No acute fundic abnormalities.  Tympanic membrane on the right obscured by wax.  No bruits/no lymphadenopathy.  No acute cardiopulmonary abnormalities except for the tachycardia noted by auscultation.  Abdomen nondistended with no liver edge or spleen edge felt.   No acute oropharynx abnormalities.  Mallampati 3.    IMPRESSION: Patient with sleep disturbance as noted above.  Patient to get baseline overnight continuous oximetry on room air.  Patient to get polysomnogram in lab if possible.  I spent 30 minutes with this patient with counseling exam and review of records.  Patient is a continue driving and safety precautions as discussed.  Patient is to have caution work around hot fire/stove/grill/water.  Patient to have caution climbing and using sharp cutting tools.  Patient to get with PCP about weight and diet control.

## 2022-10-17 DIAGNOSIS — E78.1 HYPERTRIGLYCERIDEMIA: ICD-10-CM

## 2022-10-17 RX ORDER — ATORVASTATIN CALCIUM 10 MG/1
TABLET, FILM COATED ORAL
Qty: 90 TABLET | Refills: 1 | OUTPATIENT
Start: 2022-10-17

## 2022-12-13 ENCOUNTER — HOSPITAL ENCOUNTER (OUTPATIENT)
Dept: SLEEP MEDICINE | Facility: HOSPITAL | Age: 42
Discharge: HOME | End: 2022-12-13
Admitting: PSYCHIATRY & NEUROLOGY

## 2022-12-13 VITALS
HEART RATE: 103 BPM | DIASTOLIC BLOOD PRESSURE: 72 MMHG | HEIGHT: 70 IN | WEIGHT: 233 LBS | BODY MASS INDEX: 33.36 KG/M2 | RESPIRATION RATE: 16 BRPM | OXYGEN SATURATION: 98 % | SYSTOLIC BLOOD PRESSURE: 134 MMHG

## 2022-12-13 DIAGNOSIS — G47.9 SLEEP DISTURBANCE: ICD-10-CM

## 2022-12-13 PROCEDURE — 95810 POLYSOM 6/> YRS 4/> PARAM: CPT | Performed by: PSYCHIATRY & NEUROLOGY

## 2022-12-13 PROCEDURE — 95810 POLYSOM 6/> YRS 4/> PARAM: CPT

## 2023-01-04 DIAGNOSIS — E78.1 HYPERTRIGLYCERIDEMIA: ICD-10-CM

## 2023-01-05 ENCOUNTER — OFFICE VISIT (OUTPATIENT)
Dept: INTERNAL MEDICINE | Facility: CLINIC | Age: 43
End: 2023-01-05
Payer: MEDICAID

## 2023-01-05 VITALS
WEIGHT: 232 LBS | DIASTOLIC BLOOD PRESSURE: 72 MMHG | OXYGEN SATURATION: 98 % | TEMPERATURE: 96.9 F | BODY MASS INDEX: 33.21 KG/M2 | SYSTOLIC BLOOD PRESSURE: 130 MMHG | HEART RATE: 90 BPM | HEIGHT: 70 IN

## 2023-01-05 DIAGNOSIS — Z23 NEED FOR INFLUENZA VACCINATION: ICD-10-CM

## 2023-01-05 DIAGNOSIS — I10 PRIMARY HYPERTENSION: Primary | ICD-10-CM

## 2023-01-05 DIAGNOSIS — M54.41 CHRONIC RIGHT-SIDED LOW BACK PAIN WITH RIGHT-SIDED SCIATICA: ICD-10-CM

## 2023-01-05 DIAGNOSIS — G89.29 CHRONIC RIGHT-SIDED LOW BACK PAIN WITH RIGHT-SIDED SCIATICA: ICD-10-CM

## 2023-01-05 DIAGNOSIS — R42 DIZZINESS: ICD-10-CM

## 2023-01-05 PROCEDURE — 90686 IIV4 VACC NO PRSV 0.5 ML IM: CPT

## 2023-01-05 PROCEDURE — 96372 THER/PROPH/DIAG INJ SC/IM: CPT

## 2023-01-05 PROCEDURE — 99213 OFFICE O/P EST LOW 20 MIN: CPT

## 2023-01-05 PROCEDURE — 90471 IMMUNIZATION ADMIN: CPT

## 2023-01-05 RX ORDER — TRIAMCINOLONE ACETONIDE 40 MG/ML
40 INJECTION, SUSPENSION INTRA-ARTICULAR; INTRAMUSCULAR ONCE
Status: COMPLETED | OUTPATIENT
Start: 2023-01-05 | End: 2023-01-05

## 2023-01-05 RX ORDER — ATORVASTATIN CALCIUM 10 MG/1
TABLET, FILM COATED ORAL
Qty: 90 TABLET | Refills: 1 | Status: SHIPPED | OUTPATIENT
Start: 2023-01-05

## 2023-01-05 RX ORDER — LISINOPRIL 5 MG/1
5 TABLET ORAL DAILY
Qty: 90 TABLET | Refills: 2 | Status: SHIPPED | OUTPATIENT
Start: 2023-01-05

## 2023-01-05 RX ADMIN — TRIAMCINOLONE ACETONIDE 40 MG: 40 INJECTION, SUSPENSION INTRA-ARTICULAR; INTRAMUSCULAR at 09:58

## 2023-01-05 NOTE — PROGRESS NOTES
Subjective   Gilberto Morrissey is a 42 y.o. male.   Chief Complaint   Patient presents with   • Hypertension     Follow up   • Back Pain     Lower back   Changing tire the other day        History of Present Illness   Kilo presents here today for a routine follow-up on his hypertension.  At his last visit we had recommended decreasing the lisinopril to 5 mg daily given an increased incidence of dizzy episodes.  He reports that since then his blood pressures have been running similar to today 130/72 and he has noted a decrease incidence of dizziness.  He denies any chest pains, shortness of breath, headaches or activity intolerance.  He reports that he did throw his back out again the other day helping someone change a tire.  He reports he really needs to get in with pain management, he was referred a while back but ended up missing his appointment related to the emergency that came up, states that they require a $25 fee after missing an appointment he has had a hard time coming out with money but plans on doing so.  He reports that he has treated his acute back flare with Advil 800 mg daily, he reports little improvement with this.    The following portions of the patient's history were reviewed and updated as appropriate: allergies, current medications, past family history, past medical history, past social history, past surgical history and problem list.    Review of Systems    Objective   Past Medical History:   Diagnosis Date   • ADHD (attention deficit hyperactivity disorder)    • Anxiety    • Bipolar 2 disorder (HCC)    • Claustrophobia    • Depression       Past Surgical History:   Procedure Laterality Date   • DENTAL PROCEDURE  2011    extraction        Current Outpatient Medications:   •  Adderall XR 20 MG 24 hr capsule, , Disp: , Rfl:   •  amphetamine-dextroamphetamine (ADDERALL) 10 MG tablet, Take 10 mg by mouth Daily., Disp: , Rfl:   •  atorvastatin (LIPITOR) 10 MG tablet, TAKE 1 TABLET BY MOUTH EVERY  DAY, Disp: 90 tablet, Rfl: 1  •  lamoTRIgine (LaMICtal) 200 MG tablet, TAKE 1.5 TABLET BY MOUTH ONCE A DAY, Disp: , Rfl:   •  lisinopril (PRINIVIL,ZESTRIL) 5 MG tablet, Take 1 tablet by mouth Daily., Disp: 90 tablet, Rfl: 2  •  OXcarbazepine (TRILEPTAL) 300 MG tablet, TAKE HALF A TABLET TWICE PER DAY FOR 1 WEEK. THEN TAKE 1 TABLET TWICE PER DAY THEREAFTER., Disp: , Rfl:   No current facility-administered medications for this visit.      /72 (BP Location: Left arm, Patient Position: Sitting, Cuff Size: Adult)   Pulse 90   Temp 96.9 °F (36.1 °C) (Temporal)   Ht 177.8 cm (70\")   Wt 105 kg (232 lb)   SpO2 98%   BMI 33.29 kg/m²      Body mass index is 33.29 kg/m².  BMI is >= 30 and <35. (Class 1 Obesity). The following options were offered after discussion;: exercise counseling/recommendations and nutrition counseling/recommendations       Physical Exam  Vitals and nursing note reviewed.   Constitutional:       General: He is not in acute distress.     Appearance: Normal appearance. He is obese. He is not ill-appearing, toxic-appearing or diaphoretic.   HENT:      Head: Normocephalic and atraumatic.   Eyes:      Pupils: Pupils are equal, round, and reactive to light.   Cardiovascular:      Rate and Rhythm: Normal rate and regular rhythm.      Pulses: Normal pulses.      Heart sounds: Normal heart sounds.   Pulmonary:      Effort: Pulmonary effort is normal.      Breath sounds: Normal breath sounds.   Abdominal:      General: Bowel sounds are normal.      Palpations: Abdomen is soft.   Musculoskeletal:         General: Tenderness (lower back) present. Normal range of motion.      Right lower leg: No edema.      Left lower leg: No edema.   Skin:     General: Skin is warm and dry.      Capillary Refill: Capillary refill takes less than 2 seconds.   Neurological:      General: No focal deficit present.      Mental Status: He is alert and oriented to person, place, and time. Mental status is at baseline.       Motor: No weakness.      Coordination: Coordination normal.      Gait: Gait normal.   Psychiatric:         Mood and Affect: Mood normal.         Behavior: Behavior normal.         Thought Content: Thought content normal.         Judgment: Judgment normal.               Assessment & Plan   Diagnoses and all orders for this visit:    1. Primary hypertension (Primary)  -     lisinopril (PRINIVIL,ZESTRIL) 5 MG tablet; Take 1 tablet by mouth Daily.  Dispense: 90 tablet; Refill: 2    2. Dizziness    3. Chronic right-sided low back pain with right-sided sciatica  -     triamcinolone acetonide (KENALOG-40) injection 40 mg    4. Need for influenza vaccination  -     FluLaval/Fluarix/Fluzone >6 Months               Plan of care reviewed with Kilo  His blood pressure today is 130/72 indicating good control we will continue with lisinopril 5 mg daily, order has officially been changed and new prescription sent to his pharmacy.  He has had improvement in dizziness, we will continue to monitor for this and report any increased severity or recurrence.  Continue to closely monitor blood pressures.  He did question as to when he could potentially come off of the lisinopril completely, we discussed normal blood pressures, advised that he can maintain normal blood pressure without the medication then we could consider discontinuing it, advised that he is on the antihypertensive to help prevent endorgan damage related to his hypertension.  We will go ahead and give him a Kenalog 40 IM injection today to help with the acute on chronic back pain that he is having, highly encouraged him to call and make an appointment with pain management.  Advised to decrease NSAID intake as able, does not report any current GI issues, reports he does not take more than 800 mg of Advil in 1 day.  He would like to receive the influenza vaccine today.  He will return in 3 months for annual wellness, before this if needed.

## 2023-04-14 ENCOUNTER — OFFICE VISIT (OUTPATIENT)
Dept: NEUROLOGY | Facility: CLINIC | Age: 43
End: 2023-04-14
Payer: MEDICAID

## 2023-04-14 VITALS
DIASTOLIC BLOOD PRESSURE: 80 MMHG | HEIGHT: 70 IN | SYSTOLIC BLOOD PRESSURE: 128 MMHG | RESPIRATION RATE: 18 BRPM | WEIGHT: 227 LBS | HEART RATE: 88 BPM | BODY MASS INDEX: 32.5 KG/M2

## 2023-04-14 DIAGNOSIS — G47.9 SLEEP DISTURBANCE: Primary | ICD-10-CM

## 2023-04-14 PROCEDURE — 3079F DIAST BP 80-89 MM HG: CPT | Performed by: PSYCHIATRY & NEUROLOGY

## 2023-04-14 PROCEDURE — 99213 OFFICE O/P EST LOW 20 MIN: CPT | Performed by: PSYCHIATRY & NEUROLOGY

## 2023-04-14 PROCEDURE — 3074F SYST BP LT 130 MM HG: CPT | Performed by: PSYCHIATRY & NEUROLOGY

## 2023-04-14 RX ORDER — METHYLPHENIDATE HYDROCHLORIDE 20 MG/1
20 TABLET ORAL 2 TIMES DAILY
COMMUNITY

## 2023-04-14 RX ORDER — METHYLPHENIDATE HYDROCHLORIDE 40 MG/1
40 CAPSULE, EXTENDED RELEASE ORAL EVERY MORNING
COMMUNITY

## 2023-04-14 RX ORDER — QUETIAPINE 150 MG/1
150 TABLET, FILM COATED, EXTENDED RELEASE ORAL NIGHTLY
COMMUNITY

## 2023-04-14 NOTE — PATIENT INSTRUCTIONS
Patient to get with PCP about weight and diet control with elevated BMI.  If blood work done by PCP might include an iron profile / ferritin level.  Patient to continue driving and safety precautions as discussed.  Patient have caution climbing and using sharp cutting tools and working around hot fire/stove/grill/water.

## 2023-04-14 NOTE — PROGRESS NOTES
HISTORY: Patient has a history of sleep problems since age 13 or 14 with excessive daytime fatigue but denies any cataplectic symptoms or sleep paralysis or hypnagogic hallucinations.  Patient is disabled.  Patient has a rotating sleep schedule for uncertain reasons.  Patient has ADHD and was on Adderall now was switched to Ritalin.  Patient follows with a psychiatrist .  Patient has switched from Trileptal to Seroquel at night.  Patient had in lab sleep study which showed poor sleep efficiency of only 160 minutes of sleep and no evidence of apnea hypopnea.  Patient had mild elevation periodic leg movements with a PLM index of 12.9.  Patient had overnight continuous oximetry which showed no significant desaturations.  The DEONNA was 26.  Patient does not work.  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  Patient says since usually a 4-6 hours sleep night more than 6 hours he gets cranky and less than 4 hours he gets cranky.  He says he only had 2 hours last night.  Patient says that sometimes it is not uncommon to go 23 hours to sleep at a time..  He says he drinks caffeine and caffeine does not affect his sleep one way or the other.  [place] Sleepiness Scale is 2    EXAM: Blood pressure today 128/80 left arm seated in same standing with pulse 88.  Neck circumference is 17 inches.  BMI is 32.57.  No acute oropharynx abnormalities.  Mallampati 3.  No acute fundic abnormalities.  Tympanic membranes on the right obscured by wax.  No bruits/no lymphadenopathy.  No acute cardiopulmonary abnormalities by auscultation.  Abdomen nondistended with no liver edge or spleen edge felt.    IMPRESSION: Patient has sleep disturbance.  Patient had very poor sleep efficiency in the in lab study and I would like to try to get home sleep test done  for longer sleep observance.  Patient has mild periodic leg movements.  He might need a serum iron profile and ferritin level at some point.  I do not see any recent blood work  on him.  Also cognitive behavioral therapy may be of benefit and he is to review this with his psychiatrist.  Patient is to be followed by Dr. Prakash after home sleep testing.  I spent 25 minutes with this patient with counseling exam and review of records.  Patient to get with PCP about weight and diet control with elevated BMI.

## 2023-05-01 ENCOUNTER — OFFICE VISIT (OUTPATIENT)
Dept: INTERNAL MEDICINE | Facility: CLINIC | Age: 43
End: 2023-05-01
Payer: MEDICAID

## 2023-05-01 VITALS
TEMPERATURE: 97.1 F | OXYGEN SATURATION: 98 % | SYSTOLIC BLOOD PRESSURE: 138 MMHG | HEIGHT: 70 IN | BODY MASS INDEX: 33.27 KG/M2 | DIASTOLIC BLOOD PRESSURE: 90 MMHG | WEIGHT: 232.4 LBS | HEART RATE: 88 BPM

## 2023-05-01 DIAGNOSIS — G89.29 CHRONIC LEFT-SIDED LOW BACK PAIN WITHOUT SCIATICA: ICD-10-CM

## 2023-05-01 DIAGNOSIS — I10 PRIMARY HYPERTENSION: ICD-10-CM

## 2023-05-01 DIAGNOSIS — E78.2 MIXED HYPERLIPIDEMIA: ICD-10-CM

## 2023-05-01 DIAGNOSIS — Z23 NEED FOR COVID-19 VACCINE: ICD-10-CM

## 2023-05-01 DIAGNOSIS — M54.50 CHRONIC LEFT-SIDED LOW BACK PAIN WITHOUT SCIATICA: ICD-10-CM

## 2023-05-01 DIAGNOSIS — F31.9: ICD-10-CM

## 2023-05-01 DIAGNOSIS — Z00.00 ENCOUNTER FOR ANNUAL PHYSICAL EXAM: Primary | ICD-10-CM

## 2023-05-01 DIAGNOSIS — E66.09 CLASS 1 OBESITY DUE TO EXCESS CALORIES WITH SERIOUS COMORBIDITY AND BODY MASS INDEX (BMI) OF 33.0 TO 33.9 IN ADULT: ICD-10-CM

## 2023-05-01 DIAGNOSIS — F52.32 DELAYED EJACULATION: ICD-10-CM

## 2023-05-01 NOTE — PROGRESS NOTES
Subjective   Gilberto Morrissey is a 42 y.o. male.   Chief Complaint   Patient presents with    Annual Exam     Labs in chart       History of Present Illness   Kilo presents to the office today for his annual wellness visit.  At his last appointment back in January we had discussed titrating him off of the lisinopril as he was continued to have issues with dizziness and hypotension.  He reports he successfully got off of the medication and states that his blood pressures have been running normally.  He reports that he has not slept all night and believes that that is why his blood pressure is little elevated today.  He continues to follow closely with psychiatry for management of bipolar disorder, states that this week has been particularly difficult with some increased stressors and a regular schedule and he states he has not been consistently taking his medications as prescribed, he states that he has reverted back to setting alarms to help remind himself.  He reports that he has been tolerating the Lipitor well but as he mentioned has not been taking it routinely this past week.  He states that his chronic low back pain has actually been better here lately, states that if it becomes an issue he will get back in touch with pain management.  He does have 1 particular problem he would like to address today which impacts him and his significant other's ability to have another child which they are trying today.  He states that he is able to achieve and maintain an erection but has a significant problem with reaching orgasm.  He reports that it can take quite a long time 20 to 30 minutes even if he is doing it himself.  He reports that this has been a long ongoing issue for him, anywhere from 10 to 15 years, states that this was prior to the prescription of Ritalin that he is on.  He states he has discussed with his psychiatrist and he does not believe that any of the medications he is on currently is an influencing  "factor in this area.  He states he does believe that there is a psychological component but is interested to know if there is any other additional treatment/intervention that can be implemented for this.  He reports that this has been an issue long before ever having any lower back problems.  The following portions of the patient's history were reviewed and updated as appropriate: allergies, current medications, past family history, past medical history, past social history, past surgical history and problem list.    Review of Systems    Objective   Past Medical History:   Diagnosis Date    ADHD (attention deficit hyperactivity disorder)     Anxiety     Bipolar 2 disorder     Claustrophobia     Depression     Hyperlipidemia     Hypertension       Past Surgical History:   Procedure Laterality Date    DENTAL PROCEDURE  2011    extraction        Current Outpatient Medications:     atorvastatin (LIPITOR) 10 MG tablet, TAKE 1 TABLET BY MOUTH EVERY DAY, Disp: 90 tablet, Rfl: 1    lamoTRIgine (LaMICtal) 200 MG tablet, TAKE 1.5 TABLET BY MOUTH ONCE A DAY, Disp: , Rfl:     methylphenidate (RITALIN) 20 MG tablet, Take 1 tablet by mouth 2 (Two) Times a Day., Disp: , Rfl:     methylphenidate LA (RITALIN LA) 40 MG 24 hr capsule, Take 1 capsule by mouth Every Morning, Disp: , Rfl:     QUEtiapine XR (SEROquel XR) 150 MG 24 hr tablet, Take 1 tablet by mouth Every Night., Disp: , Rfl:       /90 (BP Location: Left arm, Patient Position: Sitting, Cuff Size: Adult)   Pulse 88   Temp 97.1 °F (36.2 °C) (Temporal)   Ht 177.8 cm (70\")   Wt 105 kg (232 lb 6.4 oz)   SpO2 98%   BMI 33.35 kg/m²      Body mass index is 33.35 kg/m².  BMI is >= 30 and <35. (Class 1 Obesity). The following options were offered after discussion;: exercise counseling/recommendations and nutrition counseling/recommendations       Physical Exam  Vitals and nursing note reviewed.   Constitutional:       General: He is not in acute distress.     Appearance: " Normal appearance. He is obese. He is not ill-appearing, toxic-appearing or diaphoretic.   HENT:      Head: Normocephalic and atraumatic.      Right Ear: There is impacted cerumen.      Left Ear: Tympanic membrane, ear canal and external ear normal. There is no impacted cerumen.      Nose: Nose normal.      Mouth/Throat:      Mouth: Mucous membranes are moist.      Pharynx: Oropharynx is clear. No oropharyngeal exudate or posterior oropharyngeal erythema.      Comments: Teeth absent   Eyes:      Extraocular Movements: Extraocular movements intact.      Conjunctiva/sclera: Conjunctivae normal.      Pupils: Pupils are equal, round, and reactive to light.   Neck:      Thyroid: No thyroid mass, thyromegaly or thyroid tenderness.      Vascular: No carotid bruit.   Cardiovascular:      Rate and Rhythm: Normal rate and regular rhythm.      Pulses: Normal pulses.      Heart sounds: Normal heart sounds.   Pulmonary:      Effort: Pulmonary effort is normal.      Breath sounds: Normal breath sounds.   Abdominal:      General: Bowel sounds are normal.      Palpations: Abdomen is soft.   Musculoskeletal:         General: Normal range of motion.      Cervical back: Normal range of motion and neck supple.      Right lower leg: No edema.      Left lower leg: No edema.   Skin:     General: Skin is warm and dry.      Capillary Refill: Capillary refill takes less than 2 seconds.   Neurological:      General: No focal deficit present.      Mental Status: He is alert and oriented to person, place, and time. Mental status is at baseline.   Psychiatric:         Mood and Affect: Mood normal.         Behavior: Behavior normal.         Thought Content: Thought content normal.         Judgment: Judgment normal.             Assessment & Plan   Diagnoses and all orders for this visit:    1. Encounter for annual physical exam (Primary)    2. Primary hypertension    3. Mixed hyperlipidemia    4. Mild bipolar disorder    5. Chronic left-sided low  back pain without sciatica    6. Need for COVID-19 vaccine    7. Class 1 obesity due to excess calories with serious comorbidity and body mass index (BMI) of 33.0 to 33.9 in adult    8. Delayed ejaculation    Other orders  -     COVID-19 Bivalent (Pfizer) 12+yrs               Plan of care and recent lab work reviewed with Kilo   Blood pressure here today is mildly elevated at 138/90, reports more normotensive readings at home and suspects that the elevated reading is related to the fact that he has not slept at all in the past 20 hours.  I advised more close monitoring at home and to let me know if blood pressures are consistently 130/80 or greater.  For future reference would not consider putting him back on lisinopril because of the side effects.  Hyperlipidemia shows continued slight improvement in triglycerides, LDL is slightly elevated at 104, he is not 100% sure that he was completely fasting prior to getting his labs done.  We discussed foods that are good sources of healthy fats.  Would encourage increasing exercise as able and incorporating healthier food choices.  He continues to follow with psychiatry in relation to bipolar disorder, is currently working on getting his medications back under control.  Low back pain currently not a significant problem for him, does have referral to pain management if it does become more of an active problem.  He would like to get the newest COVID-19 vaccination, this was given today.  Recommended routine ophthalmology exams/orthodontic exams.  The delayed ejaculation or inability to ejaculate could stem from multiple problems including side effects of drugs, potential low testosterone levels, psychogenic causes. He does not present with any urinary issues or complaints, reports this has been a long ongoing thing since at least the age of 32.  Has no difficulty achieving or maintaining an erection. According to UpTo[employer], studies that have have looked at the role of low  testosterone levels and treatments with testosterone replacement and the efficacy of treating ejaculatory disorders have not proven to have any benefit. As far as his medications are concerned, looking at potential side effects the Ritalin can cause Priapism, the Seroquel does have documentation of causing issues with orgasms in some individuals. If origin is more psychogenic in origin I would recommend discussing this with therapist.     Please note that portions of this note were completed with a voice recognition program.     Electronically signed by PAO Hernandez, 05/01/23, 12:35 CDT.

## 2023-05-02 DIAGNOSIS — R68.82 LOW LIBIDO: ICD-10-CM

## 2023-05-02 DIAGNOSIS — Z12.5 SCREENING PSA (PROSTATE SPECIFIC ANTIGEN): ICD-10-CM

## 2023-05-02 DIAGNOSIS — F52.32 DELAYED EJACULATION: Primary | ICD-10-CM

## 2023-06-15 DIAGNOSIS — E78.1 HYPERTRIGLYCERIDEMIA: ICD-10-CM

## 2023-06-15 RX ORDER — ATORVASTATIN CALCIUM 10 MG/1
TABLET, FILM COATED ORAL
Qty: 90 TABLET | Refills: 1 | OUTPATIENT
Start: 2023-06-15

## 2023-08-03 DIAGNOSIS — E78.1 HYPERTRIGLYCERIDEMIA: ICD-10-CM

## 2023-08-03 RX ORDER — ATORVASTATIN CALCIUM 10 MG/1
TABLET, FILM COATED ORAL
Qty: 90 TABLET | Refills: 1 | OUTPATIENT
Start: 2023-08-03

## 2023-09-12 ENCOUNTER — OFFICE VISIT (OUTPATIENT)
Dept: INTERNAL MEDICINE | Facility: CLINIC | Age: 43
End: 2023-09-12
Payer: MEDICAID

## 2023-09-12 VITALS
TEMPERATURE: 97.3 F | HEIGHT: 70 IN | HEART RATE: 98 BPM | DIASTOLIC BLOOD PRESSURE: 94 MMHG | WEIGHT: 240.2 LBS | SYSTOLIC BLOOD PRESSURE: 142 MMHG | BODY MASS INDEX: 34.39 KG/M2 | OXYGEN SATURATION: 97 %

## 2023-09-12 DIAGNOSIS — G89.29 CHRONIC LEFT-SIDED LOW BACK PAIN WITH LEFT-SIDED SCIATICA: Primary | ICD-10-CM

## 2023-09-12 DIAGNOSIS — R03.0 ELEVATED BLOOD PRESSURE READING: ICD-10-CM

## 2023-09-12 DIAGNOSIS — M51.26 LUMBAR DISC HERNIATION: ICD-10-CM

## 2023-09-12 DIAGNOSIS — M54.42 CHRONIC LEFT-SIDED LOW BACK PAIN WITH LEFT-SIDED SCIATICA: Primary | ICD-10-CM

## 2023-09-12 PROCEDURE — 3077F SYST BP >= 140 MM HG: CPT

## 2023-09-12 PROCEDURE — 1160F RVW MEDS BY RX/DR IN RCRD: CPT

## 2023-09-12 PROCEDURE — 3080F DIAST BP >= 90 MM HG: CPT

## 2023-09-12 PROCEDURE — 1159F MED LIST DOCD IN RCRD: CPT

## 2023-09-12 PROCEDURE — 99213 OFFICE O/P EST LOW 20 MIN: CPT
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
## 2025-07-23 ENCOUNTER — OFFICE VISIT (OUTPATIENT)
Age: 45
End: 2025-07-23
Payer: MEDICAID

## 2025-07-23 VITALS
DIASTOLIC BLOOD PRESSURE: 76 MMHG | SYSTOLIC BLOOD PRESSURE: 128 MMHG | WEIGHT: 250 LBS | BODY MASS INDEX: 35.79 KG/M2 | HEIGHT: 70 IN | HEART RATE: 90 BPM | TEMPERATURE: 97.5 F | OXYGEN SATURATION: 97 %

## 2025-07-23 DIAGNOSIS — R19.7 DIARRHEA, UNSPECIFIED TYPE: ICD-10-CM

## 2025-07-23 DIAGNOSIS — Z13.29 THYROID DISORDER SCREENING: ICD-10-CM

## 2025-07-23 DIAGNOSIS — G47.00 INSOMNIA, UNSPECIFIED TYPE: ICD-10-CM

## 2025-07-23 DIAGNOSIS — I10 PRIMARY HYPERTENSION: ICD-10-CM

## 2025-07-23 DIAGNOSIS — F41.9 ANXIETY: ICD-10-CM

## 2025-07-23 DIAGNOSIS — Z13.220 LIPID SCREENING: ICD-10-CM

## 2025-07-23 DIAGNOSIS — F84.0 AUTISM: ICD-10-CM

## 2025-07-23 DIAGNOSIS — R06.83 SNORING: ICD-10-CM

## 2025-07-23 DIAGNOSIS — Z00.00 ENCOUNTER FOR ROUTINE ADULT HEALTH EXAMINATION WITHOUT ABNORMAL FINDINGS: Primary | ICD-10-CM

## 2025-07-23 RX ORDER — DEXTROAMPHETAMINE SACCHARATE, AMPHETAMINE ASPARTATE, DEXTROAMPHETAMINE SULFATE AND AMPHETAMINE SULFATE 2.5; 2.5; 2.5; 2.5 MG/1; MG/1; MG/1; MG/1
TABLET ORAL
COMMUNITY

## 2025-07-23 RX ORDER — LOSARTAN POTASSIUM 50 MG/1
1 TABLET ORAL EVERY 12 HOURS SCHEDULED
COMMUNITY
Start: 2025-06-09

## 2025-07-23 RX ORDER — DEXTROAMPHETAMINE SACCHARATE, AMPHETAMINE ASPARTATE MONOHYDRATE, DEXTROAMPHETAMINE SULFATE AND AMPHETAMINE SULFATE 5; 5; 5; 5 MG/1; MG/1; MG/1; MG/1
20 CAPSULE, EXTENDED RELEASE ORAL EVERY MORNING
COMMUNITY
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
## 2025-07-25 ENCOUNTER — REFERRAL TRIAGE (OUTPATIENT)
Age: 45
End: 2025-07-25
Payer: MEDICAID

## 2025-07-27 LAB
ALBUMIN SERPL-MCNC: 4.7 G/DL (ref 4.1–5.1)
ALP SERPL-CCNC: 103 IU/L (ref 44–121)
ALPHA-GAL IGE QN: <0.1 KU/L
ALT SERPL-CCNC: 32 IU/L (ref 0–44)
AST SERPL-CCNC: 31 IU/L (ref 0–40)
BASOPHILS # BLD AUTO: 0.1 X10E3/UL (ref 0–0.2)
BASOPHILS NFR BLD AUTO: 1 %
BEEF IGE QN: <0.1 KU/L
BILIRUB SERPL-MCNC: 0.3 MG/DL (ref 0–1.2)
BUN SERPL-MCNC: 11 MG/DL (ref 6–24)
BUN/CREAT SERPL: 11 (ref 9–20)
CALCIUM SERPL-MCNC: 9.6 MG/DL (ref 8.7–10.2)
CHLORIDE SERPL-SCNC: 100 MMOL/L (ref 96–106)
CHOLEST SERPL-MCNC: 182 MG/DL (ref 100–199)
CLAM IGE QN: <0.1 KU/L
CO2 SERPL-SCNC: 21 MMOL/L (ref 20–29)
CODFISH IGE QN: <0.1 KU/L
CORN IGE QN: <0.1 KU/L
COW MILK IGE QN: <0.1 KU/L
CREAT SERPL-MCNC: 0.96 MG/DL (ref 0.76–1.27)
EGFRCR SERPLBLD CKD-EPI 2021: 101 ML/MIN/1.73
EGG WHITE IGE QN: <0.1 KU/L
EOSINOPHIL # BLD AUTO: 0.2 X10E3/UL (ref 0–0.4)
EOSINOPHIL NFR BLD AUTO: 2 %
ERYTHROCYTE [DISTWIDTH] IN BLOOD BY AUTOMATED COUNT: 13.4 % (ref 11.6–15.4)
GLOBULIN SER CALC-MCNC: 2.8 G/DL (ref 1.5–4.5)
GLUCOSE SERPL-MCNC: 94 MG/DL (ref 70–99)
HCT VFR BLD AUTO: 50 % (ref 37.5–51)
HDLC SERPL-MCNC: 31 MG/DL
HGB BLD-MCNC: 16.7 G/DL (ref 13–17.7)
IGE SERPL-ACNC: 14 IU/ML (ref 6–495)
IMM GRANULOCYTES # BLD AUTO: 0 X10E3/UL (ref 0–0.1)
IMM GRANULOCYTES NFR BLD AUTO: 1 %
LAMB IGE QN: <0.1 KU/L
LDLC SERPL CALC-MCNC: 84 MG/DL (ref 0–99)
LYMPHOCYTES # BLD AUTO: 2 X10E3/UL (ref 0.7–3.1)
LYMPHOCYTES NFR BLD AUTO: 24 %
MCH RBC QN AUTO: 28.7 PG (ref 26.6–33)
MCHC RBC AUTO-ENTMCNC: 33.4 G/DL (ref 31.5–35.7)
MCV RBC AUTO: 86 FL (ref 79–97)
MONOCYTES # BLD AUTO: 0.7 X10E3/UL (ref 0.1–0.9)
MONOCYTES NFR BLD AUTO: 9 %
NEUTROPHILS # BLD AUTO: 5.4 X10E3/UL (ref 1.4–7)
NEUTROPHILS NFR BLD AUTO: 63 %
PEANUT IGE QN: <0.1 KU/L
PLATELET # BLD AUTO: 270 X10E3/UL (ref 150–450)
PORK IGE QN: <0.1 KU/L
POTASSIUM SERPL-SCNC: 4.6 MMOL/L (ref 3.5–5.2)
PROT SERPL-MCNC: 7.5 G/DL (ref 6–8.5)
RBC # BLD AUTO: 5.81 X10E6/UL (ref 4.14–5.8)
SCALLOP IGE QN: <0.1 KU/L
SERVICE CMNT-IMP: NORMAL
SESAME SEED IGE QN: <0.1 KU/L
SHRIMP IGE QN: 0.12 KU/L
SODIUM SERPL-SCNC: 141 MMOL/L (ref 134–144)
SOYBEAN IGE QN: <0.1 KU/L
TRIGL SERPL-MCNC: 413 MG/DL (ref 0–149)
TSH SERPL DL<=0.005 MIU/L-ACNC: 1.26 UIU/ML (ref 0.45–4.5)
VLDLC SERPL CALC-MCNC: 67 MG/DL (ref 5–40)
WALNUT IGE QN: <0.1 KU/L
WBC # BLD AUTO: 8.4 X10E3/UL (ref 3.4–10.8)
WHEAT IGE QN: <0.1 KU/L

## 2025-07-28 ENCOUNTER — RESULTS FOLLOW-UP (OUTPATIENT)
Age: 45
End: 2025-07-28
Payer: MEDICAID

## 2025-07-28 NOTE — PROGRESS NOTES
Patient cholesterol is VERY elevated. Is he taking a cholesterol medication? If not I highly recommend he take one. Untreated cholesterol can cause a risk of stroke at any time because it is plaque in his arteries. Let me know if willing to start and I will send one. Also cut out all fried greasy fatty foods, sweets and sugars, drink water only, and exercise 5 days a week. Please cut out all mirowave meals where he can please.     Looks like a possible mild allergy to shrimp but very mild. All other labs are good at this time. I think his stomach concerns are coming from a poor diet.

## 2025-07-29 ENCOUNTER — PATIENT OUTREACH (OUTPATIENT)
Age: 45
End: 2025-07-29
Payer: MEDICAID

## 2025-07-29 NOTE — OUTREACH NOTE
SW received referral via PCP re: need for community resources. SW attempted to reach patient. No Answer. LVM. Next outreach scheduled x 2 days.     Stacey LOPEZ -   Ambulatory Case Management    7/29/2025, 15:40 EDT

## 2025-07-30 NOTE — TELEPHONE ENCOUNTER
Pt returned call about labs, gave him the results and instructions for a better diet plan, pt asked if he could have a small sweet treat from time to time, I told him to use in moderation, but Pt was agreeable to starting cholesterol meds, confirmed [facility] in [place] for the script. Pt VU

## 2025-08-06 ENCOUNTER — PATIENT OUTREACH (OUTPATIENT)
Age: 45
End: 2025-08-06
Payer: MEDICAID